# Patient Record
Sex: MALE | Race: WHITE | Employment: OTHER | URBAN - METROPOLITAN AREA
[De-identification: names, ages, dates, MRNs, and addresses within clinical notes are randomized per-mention and may not be internally consistent; named-entity substitution may affect disease eponyms.]

---

## 2017-03-16 ENCOUNTER — RADIANT APPOINTMENT (OUTPATIENT)
Dept: GENERAL RADIOLOGY | Facility: CLINIC | Age: 57
End: 2017-03-16
Attending: PHYSICIAN ASSISTANT
Payer: COMMERCIAL

## 2017-03-16 ENCOUNTER — OFFICE VISIT (OUTPATIENT)
Dept: FAMILY MEDICINE | Facility: CLINIC | Age: 57
End: 2017-03-16
Payer: COMMERCIAL

## 2017-03-16 VITALS
SYSTOLIC BLOOD PRESSURE: 138 MMHG | BODY MASS INDEX: 27.85 KG/M2 | HEART RATE: 103 BPM | OXYGEN SATURATION: 96 % | WEIGHT: 188 LBS | TEMPERATURE: 98.2 F | HEIGHT: 69 IN | DIASTOLIC BLOOD PRESSURE: 80 MMHG

## 2017-03-16 DIAGNOSIS — K52.9 CHRONIC DIARRHEA: ICD-10-CM

## 2017-03-16 DIAGNOSIS — J18.9 PNEUMONIA OF RIGHT LUNG DUE TO INFECTIOUS ORGANISM, UNSPECIFIED PART OF LUNG: ICD-10-CM

## 2017-03-16 DIAGNOSIS — M46.90 INFLAMMATORY SPONDYLOPATHY, UNSPECIFIED SPINAL REGION (H): ICD-10-CM

## 2017-03-16 DIAGNOSIS — R05.9 COUGH: Primary | ICD-10-CM

## 2017-03-16 DIAGNOSIS — R05.9 COUGH: ICD-10-CM

## 2017-03-16 PROCEDURE — 71020 XR CHEST 2 VW: CPT

## 2017-03-16 PROCEDURE — 99214 OFFICE O/P EST MOD 30 MIN: CPT | Performed by: PHYSICIAN ASSISTANT

## 2017-03-16 RX ORDER — AZITHROMYCIN 250 MG/1
TABLET, FILM COATED ORAL
Qty: 6 TABLET | Refills: 0 | Status: SHIPPED | OUTPATIENT
Start: 2017-03-16 | End: 2017-07-06

## 2017-03-16 RX ORDER — BENZONATATE 100 MG/1
100 CAPSULE ORAL 3 TIMES DAILY PRN
Qty: 30 CAPSULE | Refills: 0 | Status: SHIPPED | OUTPATIENT
Start: 2017-03-16 | End: 2017-07-06

## 2017-03-16 RX ORDER — HYDROCODONE BITARTRATE AND ACETAMINOPHEN 5; 325 MG/1; MG/1
1 TABLET ORAL EVERY 6 HOURS PRN
Qty: 20 TABLET | Refills: 0 | Status: SHIPPED | OUTPATIENT
Start: 2017-03-16 | End: 2017-07-06

## 2017-03-16 RX ORDER — CODEINE PHOSPHATE AND GUAIFENESIN 10; 100 MG/5ML; MG/5ML
SOLUTION ORAL
Qty: 120 ML | Refills: 0 | Status: SHIPPED | OUTPATIENT
Start: 2017-03-16 | End: 2017-07-06

## 2017-03-16 NOTE — NURSING NOTE
"Chief Complaint   Patient presents with     Cough       Initial /80 (BP Location: Left arm, Patient Position: Chair, Cuff Size: Adult Large)  Pulse 103  Temp 98.2  F (36.8  C) (Tympanic)  Ht 5' 8.5\" (1.74 m)  Wt 188 lb (85.3 kg)  SpO2 96%  BMI 28.17 kg/m2 Estimated body mass index is 28.17 kg/(m^2) as calculated from the following:    Height as of this encounter: 5' 8.5\" (1.74 m).    Weight as of this encounter: 188 lb (85.3 kg).  Medication Reconciliation: complete Susanne Love MA      "

## 2017-03-16 NOTE — MR AVS SNAPSHOT
After Visit Summary   3/16/2017    Jonathan Barillas    MRN: [id number]           Patient Information     Date Of Birth          1960        Visit Information        Provider Department      3/16/2017 3:00 PM Gera Hackett PA-C [facility] [place]        Today's Diagnoses     Cough    -  1    Pneumonia of right lung due to infectious organism, unspecified part of lung        Chronic diarrhea        Mass of finger of left hand           Follow-ups after your visit        Additional Services     GASTROENTEROLOGY ADULT REF CONSULT ONLY       Preferred Location: [place] [facility] [phone]      Please be aware that coverage of these services is subject to the terms and limitations of your health insurance plan.  Call member services at your health plan with any benefit or coverage questions.  Any procedures must be performed at a [place] facility OR coordinated by your clinic's referral office.    Please bring the following with you to your appointment:    (1) Any X-Rays, CTs or MRIs which have been performed.  Contact the facility where they were done to arrange for  prior to your scheduled appointment.    (2) List of current medications   (3) This referral request   (4) Any documents/labs given to you for this referral                  Who to contact     If you have questions or need follow up information about today's clinic visit or your schedule please contact [facility] [place] directly at [phone].  Normal or non-critical lab and imaging results will be communicated to you by MyChart, letter or phone within 4 business days after the clinic has received the results. If you do not hear from us within 7 days, please contact the clinic through MyChart or phone. If you have a critical or abnormal lab result, we will notify you by phone as soon as possible.  Submit refill requests through [employer] or call your pharmacy and they will forward the refill request to us.  "Please allow 3 business days for your refill to be completed.          Additional Information About Your Visit        Smart Cubehart Information     [employer] gives you secure access to your electronic health record. If you see a primary care provider, you can also send messages to your care team and make appointments. If you have questions, please call your primary care clinic.  If you do not have a primary care provider, please call [phone] and they will assist you.        Care EveryWhere ID     This is your Care EveryWhere ID. This could be used by other organizations to access your [place] medical records  [id number]        Your Vitals Were     Pulse Temperature Height Pulse Oximetry BMI (Body Mass Index)       103 98.2  F (36.8  C) (Tympanic) 5' 8.5\" (1.74 m) 96% 28.17 kg/m2        Blood Pressure from Last 3 Encounters:   03/16/17 138/80   07/18/16 (!) 124/92   06/29/16 121/76    Weight from Last 3 Encounters:   03/16/17 188 lb (85.3 kg)   07/18/16 172 lb (78 kg)   06/29/16 177 lb (80.3 kg)              We Performed the Following     GASTROENTEROLOGY ADULT REF CONSULT ONLY          Today's Medication Changes          These changes are accurate as of: 3/16/17  3:51 PM.  If you have any questions, ask your nurse or doctor.               Start taking these medicines.        Dose/Directions    azithromycin 250 MG tablet   Commonly known as:  ZITHROMAX   Used for:  Pneumonia of right lung due to infectious organism, unspecified part of lung   Started by:  Gera Hackett PA-C        Two tablets first day, then one tablet daily for four days.   Quantity:  6 tablet   Refills:  0       benzonatate 100 MG capsule   Commonly known as:  TESSALON   Used for:  Pneumonia of right lung due to infectious organism, unspecified part of lung   Started by:  Gera Hackett PA-C        Dose:  100 mg   Take 1 capsule (100 mg) by mouth 3 times daily as needed   Quantity:  30 capsule   Refills:  0       " guaiFENesin-codeine 100-10 MG/5ML Soln solution   Commonly known as:  ROBITUSSIN AC   Used for:  Pneumonia of right lung due to infectious organism, unspecified part of lung   Started by:  Gera Hackett PA-C        Take 1 tsp at night for cough   Quantity:  120 mL   Refills:  0         These medicines have changed or have updated prescriptions.        Dose/Directions    HYDROcodone-acetaminophen 5-325 MG per tablet   Commonly known as:  NORCO   This may have changed:  how much to take   Used for:  Mass of finger of left hand   Changed by:  Gera Hackett PA-C        Dose:  1 tablet   Take 1 tablet by mouth every 6 hours as needed for moderate to severe pain   Quantity:  20 tablet   Refills:  0            Where to get your medicines      These medications were sent to [facility]/pharmacy #[id number] - [place], [place] - [address]  [address] [address]     Phone:  [phone]     azithromycin 250 MG tablet    benzonatate 100 MG capsule         Some of these will need a paper prescription and others can be bought over the counter.  Ask your nurse if you have questions.     Bring a paper prescription for each of these medications     guaiFENesin-codeine 100-10 MG/5ML Soln solution    HYDROcodone-acetaminophen 5-325 MG per tablet                Primary Care Provider Office Phone #    [facility] [place] [phone]       No address on file        Thank you!     Thank you for choosing [facility]  for your care. Our goal is always to provide you with excellent care. Hearing back from our patients is one way we can continue to improve our services. Please take a few minutes to complete the written survey that you may receive in the mail after your visit with us. Thank you!             Your Updated Medication List - Protect others around you: Learn how to safely use, store and throw away your medicines at www.disposemymeds.org.          This list is accurate  as of: 3/16/17  3:51 PM.  Always use your most recent med list.                   Brand Name Dispense Instructions for use    azithromycin 250 MG tablet    ZITHROMAX    6 tablet    Two tablets first day, then one tablet daily for four days.       benzonatate 100 MG capsule    TESSALON    30 capsule    Take 1 capsule (100 mg) by mouth 3 times daily as needed       guaiFENesin-codeine 100-10 MG/5ML Soln solution    ROBITUSSIN AC    120 mL    Take 1 tsp at night for cough       HYDROcodone-acetaminophen 5-325 MG per tablet    NORCO    20 tablet    Take 1 tablet by mouth every 6 hours as needed for moderate to severe pain       sildenafil 100 MG cap/tab    VIAGRA    100 tablet    Take 1/2 to 1 tab 1 hr before intercourse.       VITAMIN C PO      Take 500 mg by mouth daily       VITAMIN D3 PO      Take 2,000 Units by mouth

## 2017-03-16 NOTE — PROGRESS NOTES
SUBJECTIVE:                                                    Jonathan Barillas is a 56 year old male who presents to clinic today for the following health issues:      Acute Illness   Acute illness concerns: coughing  Onset: 2 weeks    Fever: no    Chills/Sweats: YES    Headache (location?): no     Sinus Pressure:no    Conjunctivitis:  YES: both    Ear Pain: no - tinnitus.     Rhinorrhea: YES    Congestion: YES    Sore Throat: YES- tickly, irritated from the drainage     Cough: YES-productive of brown sputum    Wheeze: YES    Decreased Appetite: YES    Nausea: no    Vomiting: no    Diarrhea:  YES    Dysuria/Freq.: no    Fatigue/Achiness: YES    Sick/Strep Exposure: no     Therapies Tried and outcome: ibuprofen.       2 week hx of cough, SOB fatigue and mild wheezing, cough is becoming more productive, he notes chills and sweats, but has not measured a fever.  No hx of asthma.      Abdominal pain/diarrhea: ongoing x 5-6 years, daily chronic diarrhea that is loose-watery.  Seems to occur directly after eating and is associated with abdominal cramping/pain and bloating.  Cannot identify a food trigger, no other symptoms.  He has a hx of ankylosing spondylitis and would like to see a GI for further evaluation.  He is requesting a referral today      Back pain: Intermittently takes norco for back pain ( last prescribed 8 months ago, #40 tablets).  On average takes 1 tab every 2-3 weeks when back pain acutely worsens.  He has not seen a rheumatologist for his ankylosing spondylitis.  Would like refill of norco today. No new or worsening symptoms recently            Problem list and histories reviewed & adjusted, as indicated.  Additional history: as documented    Patient Active Problem List   Diagnosis     Inflammatory spondylopathy (H)     Vitamin D deficiency     CARDIOVASCULAR SCREENING; LDL GOAL LESS THAN 130     Chronic diarrhea     History of tobacco abuse     Hypertension goal BP (blood pressure) < 140/80      Acute gallstone pancreatitis     Generalized anxiety disorder     Iritis     Ankylosing spondylitis (H)     Ganglion cyst of finger of left hand     Past Surgical History   Procedure Laterality Date     Endoscopic ultrasound lower gastrointestional tract (gi) N/A 12/9/2015     Procedure: ENDOSCOPIC ULTRASOUND LOWER GASTROINTESTIONAL TRACT (GI);  Surgeon: Flaquito Crawford MD;  Location: [facility] OR     Laparoscopic cholecystectomy N/A 12/11/2015     Procedure: LAPAROSCOPIC CHOLECYSTECTOMY;  Surgeon: Romain Cervantes MD;  Location: [facility] OR     Excise mass finger Left 7/18/2016     Procedure: EXCISE MASS FINGER;  Surgeon: Maikol Meier MD;  Location: [facility] OR       Social History   Substance Use Topics     Smoking status: Former Smoker     Packs/day: 1.00     Years: 20.00     Types: Cigarettes     Quit date: 1/1/2016     Smokeless tobacco: Never Used     Alcohol use 0.0 oz/week     0 Standard drinks or equivalent per week      Comment: 4 drinks per week     Family History   Problem Relation Age of Onset     Autoimmune Disease Other      Neurologic Disorder Cousin      ALS     Family History Negative No family hx of          Current Outpatient Prescriptions   Medication Sig Dispense Refill     azithromycin (ZITHROMAX) 250 MG tablet Two tablets first day, then one tablet daily for four days. 6 tablet 0     benzonatate (TESSALON) 100 MG capsule Take 1 capsule (100 mg) by mouth 3 times daily as needed 30 capsule 0     guaiFENesin-codeine (ROBITUSSIN AC) 100-10 MG/5ML SOLN solution Take 1 tsp at night for cough 120 mL 0     HYDROcodone-acetaminophen (NORCO) 5-325 MG per tablet Take 1 tablet by mouth every 6 hours as needed for moderate to severe pain 20 tablet 0     Cholecalciferol (VITAMIN D3 PO) Take 2,000 Units by mouth       Ascorbic Acid (VITAMIN C PO) Take 500 mg by mouth daily       sildenafil (VIAGRA) 100 MG tablet Take 1/2 to 1 tab 1 hr before intercourse. 100 tablet 0     Allergies   Allergen Reactions  "    Penicillin [Penicillins] Swelling       Reviewed and updated as needed this visit by clinical staff       Reviewed and updated as needed this visit by Provider         ROS:  Constitutional, HEENT, cardiovascular, pulmonary, gi and gu systems are negative, except as otherwise noted.    OBJECTIVE:                                                    /80 (BP Location: Left arm, Patient Position: Chair, Cuff Size: Adult Large)  Pulse 103  Temp 98.2  F (36.8  C) (Tympanic)  Ht 5' 8.5\" (1.74 m)  Wt 188 lb (85.3 kg)  SpO2 96%  BMI 28.17 kg/m2  Body mass index is 28.17 kg/(m^2).  GENERAL: healthy, alert and no distress  EYES: Eyes grossly normal to inspection, PERRL and conjunctivae and sclerae normal  HENT: ear canals and TM's normal, nose and mouth without ulcers or lesions  NECK: no adenopathy  RESP:crackles noted at right lung base, scattered wheezing throughout  CV: regular rate and rhythm, normal S1 S2, no S3 or S4, no murmur  Diagnostic Test Results:  none      ASSESSMENT/PLAN:                                                        1. Pneumonia of right lung due to infectious organism, unspecified part of lung  Chest x ray done today and read with patient present in clinic.  Right heart border is blunted by small patchy appearing infiltrate.  Will treat with z pack for PNA.  - azithromycin (ZITHROMAX) 250 MG tablet; Two tablets first day, then one tablet daily for four days.  Dispense: 6 tablet; Refill: 0  - benzonatate (TESSALON) 100 MG capsule; Take 1 capsule (100 mg) by mouth 3 times daily as needed  Dispense: 30 capsule; Refill: 0  - guaiFENesin-codeine (ROBITUSSIN AC) 100-10 MG/5ML SOLN solution; Take 1 tsp at night for cough  Dispense: 120 mL; Refill: 0    2. Chronic diarrhea  - GASTROENTEROLOGY ADULT REF CONSULT ONLY    3. Cough  - XR Chest 2 Views; Future    4. Inflammatory spondylopathy, unspecified spinal region (H)  Small script of Norco given , advised to follow up with PCP for further " fills.      See Patient Instructions    Gera Hackett PA-C  [facility]

## 2017-05-02 ENCOUNTER — TELEPHONE (OUTPATIENT)
Dept: FAMILY MEDICINE | Facility: CLINIC | Age: 57
End: 2017-05-02

## 2017-05-02 NOTE — TELEPHONE ENCOUNTER
Patient call stating that he was cutting tree branches with a power saw.  A piece of wood flying to his left thumb on Sunday  Thumb is very swollen, black bruise underneath nail bed and cuticle, very painful, warm to touch.     Denies foreign body in nail, discharge, pus, fever.     Has been using ice with no relief  appt scheduled for tomorrow morning for further assessment    Next 5 appointments (look out 90 days)     May 03, 2017  9:00 AM CDT   Office Visit with Ashley Rausch MD   [facility]    [address] [place] [id number] [phone]                Tami Dunn RN

## 2017-07-06 ENCOUNTER — RADIANT APPOINTMENT (OUTPATIENT)
Dept: GENERAL RADIOLOGY | Facility: CLINIC | Age: 57
End: 2017-07-06
Attending: FAMILY MEDICINE
Payer: COMMERCIAL

## 2017-07-06 ENCOUNTER — OFFICE VISIT (OUTPATIENT)
Dept: FAMILY MEDICINE | Facility: CLINIC | Age: 57
End: 2017-07-06
Payer: COMMERCIAL

## 2017-07-06 VITALS
SYSTOLIC BLOOD PRESSURE: 144 MMHG | BODY MASS INDEX: 27.42 KG/M2 | HEART RATE: 84 BPM | WEIGHT: 183 LBS | OXYGEN SATURATION: 97 % | DIASTOLIC BLOOD PRESSURE: 86 MMHG | TEMPERATURE: 98.2 F

## 2017-07-06 DIAGNOSIS — J20.9 ACUTE BRONCHITIS WITH SYMPTOMS > 10 DAYS: ICD-10-CM

## 2017-07-06 DIAGNOSIS — R05.9 COUGH: Primary | ICD-10-CM

## 2017-07-06 DIAGNOSIS — R05.9 COUGH: ICD-10-CM

## 2017-07-06 PROCEDURE — 99214 OFFICE O/P EST MOD 30 MIN: CPT | Performed by: FAMILY MEDICINE

## 2017-07-06 PROCEDURE — 71020 XR CHEST 2 VW: CPT

## 2017-07-06 RX ORDER — ALBUTEROL SULFATE 90 UG/1
2 AEROSOL, METERED RESPIRATORY (INHALATION) EVERY 6 HOURS PRN
Qty: 1 INHALER | Refills: 0 | Status: SHIPPED | OUTPATIENT
Start: 2017-07-06 | End: 2017-09-19

## 2017-07-06 RX ORDER — AZITHROMYCIN 250 MG/1
TABLET, FILM COATED ORAL
Qty: 6 TABLET | Refills: 0 | Status: SHIPPED | OUTPATIENT
Start: 2017-07-06 | End: 2017-09-19

## 2017-07-06 NOTE — NURSING NOTE
"Chief Complaint   Patient presents with     Cough       Initial /86 (BP Location: Left arm)  Pulse 84  Temp 98.2  F (36.8  C) (Tympanic)  Wt 183 lb (83 kg)  SpO2 97%  BMI 27.42 kg/m2 Estimated body mass index is 27.42 kg/(m^2) as calculated from the following:    Height as of 3/16/17: 5' 8.5\" (1.74 m).    Weight as of this encounter: 183 lb (83 kg).  Medication Reconciliation: complete    Current Outpatient Prescriptions   Medication Sig Dispense Refill     Cholecalciferol (VITAMIN D3 PO) Take 2,000 Units by mouth       Ascorbic Acid (VITAMIN C PO) Take 500 mg by mouth daily       sildenafil (VIAGRA) 100 MG tablet Take 1/2 to 1 tab 1 hr before intercourse. 100 tablet 0     azithromycin (ZITHROMAX) 250 MG tablet Two tablets first day, then one tablet daily for four days. (Patient not taking: Reported on 7/6/2017) 6 tablet 0     benzonatate (TESSALON) 100 MG capsule Take 1 capsule (100 mg) by mouth 3 times daily as needed (Patient not taking: Reported on 7/6/2017) 30 capsule 0     guaiFENesin-codeine (ROBITUSSIN AC) 100-10 MG/5ML SOLN solution Take 1 tsp at night for cough (Patient not taking: Reported on 7/6/2017) 120 mL 0     HYDROcodone-acetaminophen (NORCO) 5-325 MG per tablet Take 1 tablet by mouth every 6 hours as needed for moderate to severe pain (Patient not taking: Reported on 7/6/2017) 20 tablet 0       Kolby NGUYEN CMA  "

## 2017-07-06 NOTE — MR AVS SNAPSHOT
After Visit Summary   7/6/2017    Jonathan Barillas    MRN: [id number]           Patient Information     Date Of Birth          1960        Visit Information        Provider Department      7/6/2017 1:20 PM Casey Gilbert MD [facility] [place]        Today's Diagnoses     Cough    -  1    Acute bronchitis with symptoms > 10 days           Follow-ups after your visit        Who to contact     If you have questions or need follow up information about today's clinic visit or your schedule please contact [facility] [place] directly at [phone].  Normal or non-critical lab and imaging results will be communicated to you by Das[employer], letter or phone within 4 business days after the clinic has received the results. If you do not hear from us within 7 days, please contact the clinic through Dasient[employer] or phone. If you have a critical or abnormal lab result, we will notify you by phone as soon as possible.  Submit refill requests through [employer] or call your pharmacy and they will forward the refill request to us. Please allow 3 business days for your refill to be completed.          Additional Information About Your Visit        MyChart Information     [employer] gives you secure access to your electronic health record. If you see a primary care provider, you can also send messages to your care team and make appointments. If you have questions, please call your primary care clinic.  If you do not have a primary care provider, please call [phone] and they will assist you.        Care EveryWhere ID     This is your Care EveryWhere ID. This could be used by other organizations to access your [place] medical records  [id number]        Your Vitals Were     Pulse Temperature Pulse Oximetry BMI (Body Mass Index)          84 98.2  F (36.8  C) (Tympanic) 97% 27.42 kg/m2         Blood Pressure from Last 3 Encounters:   07/06/17 144/86   03/16/17 138/80   07/18/16 (!) 124/92    Weight from  Last 3 Encounters:   07/06/17 183 lb (83 kg)   03/16/17 188 lb (85.3 kg)   07/18/16 172 lb (78 kg)                 Today's Medication Changes          These changes are accurate as of: 7/6/17  2:13 PM.  If you have any questions, ask your nurse or doctor.               Start taking these medicines.        Dose/Directions    albuterol 108 (90 BASE) MCG/ACT Inhaler   Commonly known as:  PROAIR HFA/PROVENTIL HFA/VENTOLIN HFA   Used for:  Acute bronchitis with symptoms > 10 days, Cough   Started by:  Casey Gilbert MD        Dose:  2 puff   Inhale 2 puffs into the lungs every 6 hours as needed for shortness of breath / dyspnea or wheezing   Quantity:  1 Inhaler   Refills:  0         Stop taking these medicines if you haven't already. Please contact your care team if you have questions.     benzonatate 100 MG capsule   Commonly known as:  TESSALON   Stopped by:  Casey Gilbert MD           guaiFENesin-codeine 100-10 MG/5ML Soln solution   Commonly known as:  ROBITUSSIN AC   Stopped by:  Casey Gilbert MD           HYDROcodone-acetaminophen 5-325 MG per tablet   Commonly known as:  NORCO   Stopped by:  Casey Gilbert MD                Where to get your medicines      These medications were sent to [facility] [address] [address]     Phone:  [phone]     albuterol 108 (90 BASE) MCG/ACT Inhaler    azithromycin 250 MG tablet                Primary Care Provider Office Phone #    [facility] [phone]       No address on file        Equal Access to Services     JONA SILVERMAN AH: Hadii jaci ku hadasho Soomaali, waaxda luqadaha, qaybta kaalmada aderikiyada, bernice sarabia. So [facility] [phone].    ATENCIÓN: Si habla español, tiene a taylor disposición servicios gratuitos de asistencia lingüística. Llame al [phone].    We comply with applicable federal civil rights laws and [place] laws. We do not  discriminate on the basis of race, color, national origin, age, disability sex, sexual orientation or gender identity.            Thank you!     Thank you for choosing [facility] [place]  for your care. Our goal is always to provide you with excellent care. Hearing back from our patients is one way we can continue to improve our services. Please take a few minutes to complete the written survey that you may receive in the mail after your visit with us. Thank you!             Your Updated Medication List - Protect others around you: Learn how to safely use, store and throw away your medicines at www.disposemymeds.org.          This list is accurate as of: 7/6/17  2:13 PM.  Always use your most recent med list.                   Brand Name Dispense Instructions for use Diagnosis    albuterol 108 (90 BASE) MCG/ACT Inhaler    PROAIR HFA/PROVENTIL HFA/VENTOLIN HFA    1 Inhaler    Inhale 2 puffs into the lungs every 6 hours as needed for shortness of breath / dyspnea or wheezing    Acute bronchitis with symptoms > 10 days, Cough       azithromycin 250 MG tablet    ZITHROMAX    6 tablet    Two tablets first day, then one tablet daily for four days.    Acute bronchitis with symptoms > 10 days       sildenafil 100 MG cap/tab    VIAGRA    100 tablet    Take 1/2 to 1 tab 1 hr before intercourse.    Impotence of organic origin       VITAMIN C PO      Take 500 mg by mouth daily        VITAMIN D3 PO      Take 2,000 Units by mouth

## 2017-07-06 NOTE — PROGRESS NOTES
SUBJECTIVE:                                                    Jonathan Barillas is a 56 year old male who presents to clinic today for the following health issues:      Acute Illness   Acute illness concerns: cough  Smoking habit.  Onset: 1 week    Fever: no    Chills/Sweats: no    Headache (location?): no    Sinus Pressure:no    Conjunctivitis:  no    Ear Pain: no    Rhinorrhea: no    Congestion: no    Sore Throat: no     Cough: YES    Wheeze: no    Decreased Appetite: no    Nausea: no    Vomiting: no    Diarrhea:  no    Dysuria/Freq.: no    Fatigue/Achiness: no    Sick/Strep Exposure: no     Therapies Tried and outcome: otc antihistamine       Problem list and histories reviewed & adjusted, as indicated.  Additional history: as documented        Reviewed and updated as needed this visit by clinical staff  Tobacco  Allergies  Meds       Reviewed and updated as needed this visit by Provider         ROS:  C: NEGATIVE for fever, chills, change in weight  E/M: NEGATIVE for ear, mouth and throat problems  RESP:POSITIVE for cough-productive  CV: NEGATIVE for chest pain, palpitations or peripheral edema    OBJECTIVE:                                                    /86 (BP Location: Left arm)  Pulse 84  Temp 98.2  F (36.8  C) (Tympanic)  Wt 183 lb (83 kg)  SpO2 97%  BMI 27.42 kg/m2  Body mass index is 27.42 kg/(m^2).   GENERAL: healthy, alert, well nourished, well hydrated, no distress  HENT: ear canals- normal; TMs- normal; Nose- normal; Mouth- no ulcers, no lesions  NECK: no tenderness, no adenopathy, no asymmetry, no masses, no stiffness; thyroid- normal to palpation  RESP: lungs have mild to moderate wheezing.  CV: regular rates and rhythm, normal S1 S2, no S3 or S4 and no murmur, no click or rub -         ASSESSMENT/PLAN:                                                        ICD-10-CM    1. Cough R05 XR Chest 2 Views     albuterol (PROAIR HFA/PROVENTIL HFA/VENTOLIN HFA) 108 (90 BASE) MCG/ACT  Inhaler   2. Acute bronchitis with symptoms > 10 days J20.9 azithromycin (ZITHROMAX) 250 MG tablet     albuterol (PROAIR HFA/PROVENTIL HFA/VENTOLIN HFA) 108 (90 BASE) MCG/ACT Inhaler   CXR reviewed, no acute changes.  Symptoms most likely due to the bronchitis, suggested azithromycin along with the albuterol.  Dicussed quitting smoking.    Casey Gilbert MD  [facility]

## 2017-09-12 ENCOUNTER — TELEPHONE (OUTPATIENT)
Dept: FAMILY MEDICINE | Facility: CLINIC | Age: 57
End: 2017-09-12

## 2017-09-12 NOTE — TELEPHONE ENCOUNTER
Pt called and is having back pain and would like an rx for hydrocodone. Please send rx to  [facility]. Any questions pt can be reached [phone].   Reba Astorga,

## 2017-09-19 ENCOUNTER — OFFICE VISIT (OUTPATIENT)
Dept: FAMILY MEDICINE | Facility: CLINIC | Age: 57
End: 2017-09-19
Payer: COMMERCIAL

## 2017-09-19 VITALS
WEIGHT: 176 LBS | HEART RATE: 99 BPM | DIASTOLIC BLOOD PRESSURE: 92 MMHG | OXYGEN SATURATION: 98 % | SYSTOLIC BLOOD PRESSURE: 148 MMHG | BODY MASS INDEX: 26.37 KG/M2 | TEMPERATURE: 99.1 F

## 2017-09-19 DIAGNOSIS — R97.20 ELEVATED PROSTATE SPECIFIC ANTIGEN (PSA): ICD-10-CM

## 2017-09-19 DIAGNOSIS — Z79.899 CONTROLLED SUBSTANCE AGREEMENT SIGNED: ICD-10-CM

## 2017-09-19 DIAGNOSIS — I10 HYPERTENSION GOAL BP (BLOOD PRESSURE) < 140/80: ICD-10-CM

## 2017-09-19 DIAGNOSIS — G89.29 OTHER CHRONIC PAIN: ICD-10-CM

## 2017-09-19 DIAGNOSIS — M45.0 ANKYLOSING SPONDYLITIS OF MULTIPLE SITES IN SPINE (H): ICD-10-CM

## 2017-09-19 DIAGNOSIS — M46.90 INFLAMMATORY SPONDYLOPATHY, UNSPECIFIED SPINAL REGION (H): Primary | ICD-10-CM

## 2017-09-19 LAB
ERYTHROCYTE [DISTWIDTH] IN BLOOD BY AUTOMATED COUNT: 13 % (ref 10–15)
HCT VFR BLD AUTO: 50.3 % (ref 40–53)
HGB BLD-MCNC: 17 G/DL (ref 13.3–17.7)
MCH RBC QN AUTO: 33.3 PG (ref 26.5–33)
MCHC RBC AUTO-ENTMCNC: 33.8 G/DL (ref 31.5–36.5)
MCV RBC AUTO: 98 FL (ref 78–100)
PLATELET # BLD AUTO: 248 10E9/L (ref 150–450)
RBC # BLD AUTO: 5.11 10E12/L (ref 4.4–5.9)
WBC # BLD AUTO: 11.4 10E9/L (ref 4–11)

## 2017-09-19 PROCEDURE — 85027 COMPLETE CBC AUTOMATED: CPT | Performed by: INTERNAL MEDICINE

## 2017-09-19 PROCEDURE — 36415 COLL VENOUS BLD VENIPUNCTURE: CPT | Performed by: INTERNAL MEDICINE

## 2017-09-19 PROCEDURE — G0103 PSA SCREENING: HCPCS | Performed by: INTERNAL MEDICINE

## 2017-09-19 PROCEDURE — 80053 COMPREHEN METABOLIC PANEL: CPT | Performed by: INTERNAL MEDICINE

## 2017-09-19 PROCEDURE — 99214 OFFICE O/P EST MOD 30 MIN: CPT | Performed by: INTERNAL MEDICINE

## 2017-09-19 RX ORDER — HYDROCODONE BITARTRATE AND ACETAMINOPHEN 5; 325 MG/1; MG/1
1-2 TABLET ORAL EVERY 6 HOURS PRN
Qty: 40 TABLET | Refills: 0 | Status: SHIPPED | OUTPATIENT
Start: 2017-09-19 | End: 2018-01-09

## 2017-09-19 NOTE — MR AVS SNAPSHOT
After Visit Summary   9/19/2017    Jonathan Barillas    MRN: [id number]           Patient Information     Date Of Birth          1960        Visit Information        Provider Department      9/19/2017 2:00 PM Thuy Bustos MD [facility] [place]        Today's Diagnoses     Inflammatory spondylopathy, unspecified spinal region (H)    -  1    Ankylosing spondylitis of multiple sites in spine (H)        Elevated prostate specific antigen (PSA)        Hypertension goal BP (blood pressure) < 140/80        Controlled substance agreement signed        Other chronic pain           Follow-ups after your visit        Who to contact     If you have questions or need follow up information about today's clinic visit or your schedule please contact [facility] [place] directly at [phone].  Normal or non-critical lab and imaging results will be communicated to you by MyChart, letter or phone within 4 business days after the clinic has received the results. If you do not hear from us within 7 days, please contact the clinic through bVisualhart or phone. If you have a critical or abnormal lab result, we will notify you by phone as soon as possible.  Submit refill requests through Volumental or call your pharmacy and they will forward the refill request to us. Please allow 3 business days for your refill to be completed.          Additional Information About Your Visit        MyChart Information     Volumental gives you secure access to your electronic health record. If you see a primary care provider, you can also send messages to your care team and make appointments. If you have questions, please call your primary care clinic.  If you do not have a primary care provider, please call [phone] and they will assist you.        Care EveryWhere ID     This is your Care EveryWhere ID. This could be used by other organizations to access your [facility] medical records  [id number]        Your  Vitals Were     Pulse Temperature Pulse Oximetry BMI (Body Mass Index)          99 99.1  F (37.3  C) (Oral) 98% 26.37 kg/m2         Blood Pressure from Last 3 Encounters:   09/19/17 (!) 148/92   07/06/17 144/86   03/16/17 138/80    Weight from Last 3 Encounters:   09/19/17 176 lb (79.8 kg)   07/06/17 183 lb (83 kg)   03/16/17 188 lb (85.3 kg)              We Performed the Following     CBC with platelets     Comprehensive metabolic panel     Prostate spec antigen screen          Today's Medication Changes          These changes are accurate as of: 9/19/17 11:59 PM.  If you have any questions, ask your nurse or doctor.               Start taking these medicines.        Dose/Directions    HYDROcodone-acetaminophen 5-325 MG per tablet   Commonly known as:  NORCO   Used for:  Inflammatory spondylopathy, unspecified spinal region (H)   Started by:  Thuy Bustos MD        Dose:  1-2 tablet   Take 1-2 tablets by mouth every 6 hours as needed for moderate to severe pain   Quantity:  40 tablet   Refills:  0            Where to get your medicines      Some of these will need a paper prescription and others can be bought over the counter.  Ask your nurse if you have questions.     Bring a paper prescription for each of these medications     HYDROcodone-acetaminophen 5-325 MG per tablet                Primary Care Provider Office Phone # Fax #    Thuy Bustos [phone]        [facility] [address] [facility] [place] [address]        Equal Access to Services     [facility] AH: Hadii jaci webstero Soemmyali, waaxda luqadaha, qaybta kaalmada adeegyada, waxay luis barger aderiki sarabia. So [facility] [phone].    ATENCIÓN: Si habla español, tiene a taylor disposición servicios gratuitos de asistencia lingüística. Llame al [phone].    We comply with applicable federal civil rights laws and [place] laws. We do not discriminate on the basis of race, color, national origin, age, disability sex, sexual  orientation or gender identity.            Thank you!     Thank you for choosing [facility] [place]  for your care. Our goal is always to provide you with excellent care. Hearing back from our patients is one way we can continue to improve our services. Please take a few minutes to complete the written survey that you may receive in the mail after your visit with us. Thank you!             Your Updated Medication List - Protect others around you: Learn how to safely use, store and throw away your medicines at www.disposemymeds.org.          This list is accurate as of: 9/19/17 11:59 PM.  Always use your most recent med list.                   Brand Name Dispense Instructions for use Diagnosis    HYDROcodone-acetaminophen 5-325 MG per tablet    NORCO    40 tablet    Take 1-2 tablets by mouth every 6 hours as needed for moderate to severe pain    Inflammatory spondylopathy, unspecified spinal region (H)       sildenafil 100 MG tablet    VIAGRA    100 tablet    Take 1/2 to 1 tab 1 hr before intercourse.    Impotence of organic origin       VITAMIN C PO      Take 500 mg by mouth daily        VITAMIN D3 PO      Take 2,000 Units by mouth

## 2017-09-19 NOTE — PROGRESS NOTES
SUBJECTIVE:   Jonathan Barillas is a 56 year old male who presents to clinic today for the following health issues:      Concern - medication request   Onset: 40 years     Description:    pt requesting pain medication     Intensity: moderate to severe     Progression of Symptoms:  worsening    Accompanying Signs & Symptoms:  Joint pain     Previous history of similar problem:   Yes     Precipitating factors:   Worsened by:     Alleviating factors:  Improved by: Advil     Therapies Tried and outcome:  Janneth Castillo is a 55 y/o male with Ankylosing Spondylitis. He is getting more frequent flare ups. He has seen two rheumatologists, most recently was 6 months ago. He was not given any solution. He was told to exercise, stretch, take NSAIDS, and recommended starting Humira or Enbrel. He generally will take Ibuprofen 400 mg in the morning and that is all he needs.       Problem list and histories reviewed & adjusted, as indicated.  Additional history: as documented    Patient Active Problem List   Diagnosis     Inflammatory spondylopathy (H)     Vitamin D deficiency     CARDIOVASCULAR SCREENING; LDL GOAL LESS THAN 130     Chronic diarrhea     History of tobacco abuse     Hypertension goal BP (blood pressure) < 140/80     Acute gallstone pancreatitis     Generalized anxiety disorder     Iritis     Ankylosing spondylitis (H)     Ganglion cyst of finger of left hand     Past Surgical History:   Procedure Laterality Date     ENDOSCOPIC ULTRASOUND LOWER GASTROINTESTIONAL TRACT (GI) N/A 12/9/2015    Procedure: ENDOSCOPIC ULTRASOUND LOWER GASTROINTESTIONAL TRACT (GI);  Surgeon: Flaquito Crawford MD;  Location:  OR     EXCISE MASS FINGER Left 7/18/2016    Procedure: EXCISE MASS FINGER;  Surgeon: Maikol Meier MD;  Location:  OR     LAPAROSCOPIC CHOLECYSTECTOMY N/A 12/11/2015    Procedure: LAPAROSCOPIC CHOLECYSTECTOMY;  Surgeon: Romain Cervantes MD;  Location:  OR       Social History   Substance Use  Topics     Smoking status: Former Smoker     Packs/day: 1.00     Years: 20.00     Types: Cigarettes     Quit date: 1/1/2016     Smokeless tobacco: Never Used     Alcohol use 0.0 oz/week     0 Standard drinks or equivalent per week      Comment: 4 drinks per week     Family History   Problem Relation Age of Onset     Autoimmune Disease Other      Neurologic Disorder Cousin      ALS     Family History Negative No family hx of              [facility] and [facility]       Reviewed and updated as needed this visit by Provider         ROS:  Constitutional, HEENT, cardiovascular, pulmonary, gi and gu systems are negative, except as otherwise noted.      OBJECTIVE:   BP (!) 148/92 (BP Location: Left arm, Cuff Size: Adult Regular)  Pulse 99  Temp 99.1  F (37.3  C) (Oral)  Wt 176 lb (79.8 kg)  SpO2 98%  BMI 26.37 kg/m2  Body mass index is 26.37 kg/(m^2).  GENERAL: healthy, alert and no distress  RESP: lungs clear to auscultation - no rales, rhonchi or wheezes  CV: regular rate and rhythm, normal S1 S2, no S3 or S4, no murmur, click or rub, no peripheral edema and peripheral pulses strong  PSYCH: mentation appears normal, affect normal/bright    Diagnostic Test Results:  none     ASSESSMENT/PLAN:     1. Inflammatory spondylopathy, unspecified spinal region (H)  Does not want to go on a biologic medication due to risk for side effects and infection. I can understand his hesitation. He is generally able to get by with taking 400 mg of Advil in the morning but reports a flare every several weeks. His last script for Norco lasted him about 3 months. Will plan to refill today and can refill every 3 months as needed. Controlled substance agreement signed today.   - HYDROcodone-acetaminophen (NORCO) 5-325 MG per tablet; Take 1-2 tablets by mouth every 6 hours as needed for moderate to severe pain  Dispense: 40 tablet; Refill: 0  - Comprehensive metabolic panel  - CBC with  platelets    2. Ankylosing spondylitis of multiple sites in spine (H)    3. Elevated prostate specific antigen (PSA)  - Prostate spec antigen screen    4. Hypertension goal BP (blood pressure) < 140/80  BP high today. Will recheck at next visit.       Follow up in 6 month(s) or sooner if needed      Thuy Bustos MD  [facility]

## 2017-09-19 NOTE — LETTER
[facility]    09/19/17    Patient: Jonathan Barillas  YOB: 1960  Medical Record Number: [id number]                                                                  Controlled Substance Agreement  I understand that my care provider has prescribed controlled substances (narcotics, tranquilizers, and/or stimulants) to help manage my condition(s).  I am taking this medicine to help me function or work.  I know that this is strong medicine.  It could have serious side effects and even cause a dependency on the drug.  If I stop these medicines suddenly, I could have severe withdrawal symptoms.    The risks, benefits, and side effects of these medication(s) were explained to me.  I agree that:  1. I will take part in other treatments as advised by my provider.  This may be psychiatry or counseling, physical therapy, behavioral therapy, group treatment, or a referral to a pain clinic.  I will reduce or stop my medicine when my provider tells me to do so.   2. I will take my medicines as prescribed.  I will not change the dose or schedule unless my provider tells me to.  There will be no refills if I  run out early.   I may be contacted at any time without warning and asked to complete a drug test or pill count.   3. I will keep all my appointments at the clinic.  If I miss appointments or fail to follow instructions, my provider may stop my medicine.  4. I will not ask other providers to prescribe controlled substances. And I will not accept controlled substances from other people. If I need another prescribed controlled substance for a new reason, I will notify my provider within one business day.  5. If I enroll in the [place] Medical Marijuana program, I will tell my provider.  I will also sign an agreement to share my medical records with my provider.  6. I will use one pharmacy to fill all of my controlled substance prescriptions.  If my prescription is mailed to my pharmacy, it may  take 5 to 7 days for my medicine to be ready.  7. I understand that my provider, clinic care team, and pharmacy can track controlled substance prescriptions from other providers through a central database (prescription monitoring program).  8. I will bring in my list of medications (or my medicine bottles) each time I come to the clinic.  REV- 04/2016                                                                                                                                            Page 1 of 2      [facility] [place]    09/19/17    Patient: Jonathan Barillas  YOB: 1960  Medical Record Number: [id number]    9. Refills of controlled substances will be made only during office hours.  It is up to me to make sure that I do not run out of my medicines on weekends or holidays.    10. I am responsible for my prescriptions.  If the medicine is lost or stolen, it will not be replaced.   I also agree not to share these medicines with anyone.  11. I agree to not use ANY illegal or recreational drugs.  This includes marijuana, cocaine, bath salts or other drugs.  I agree not to use alcohol unless my provider says I may.  I agree to give urine samples whenever asked.  If I fail to give a urine sample, the provider may stop my medicine.     12. I will tell my nurse or provider right away if I become pregnant or have a new medical problem treated outside of [facility].  13. I understand that this medicine can affect my thinking and judgment.  It may be unsafe for me to drive, use machinery and do dangerous tasks.  I will not do any of these things until I know how the medicine affects me.  If my dose changes, I will wait to see how it affects me.  I will contact my provider if I have concerns about medicine side effects.  I understand that if I do not follow any of the conditions above, my prescriptions or treatment may be stopped.    I agree that my provider, clinic care team, and pharmacy may  work with any city, state or federal law enforcement agency that investigates the misuse, sale, or other diversion of my controlled medicine. I will allow my provider to discuss my care with or share a copy of this agreement with any other treating provider, pharmacy or emergency room where I receive care.  I agree to give up (waive) any right of privacy or confidentiality with respect to these authorizations.   I have read this agreement and have asked questions about anything I did not understand.   ___________________________________    ___________________________  Patient Signature                                                           Date and Time  ___________________________________     ____________________________  Witness                                                                            Date and Time  ___________________________________  Thuy Bustos MD  REV-  04/2016                                                                                                                                                                 Page 2 of 2

## 2017-09-20 PROBLEM — Z79.899 CONTROLLED SUBSTANCE AGREEMENT SIGNED: Status: ACTIVE | Noted: 2017-09-20

## 2017-09-20 LAB
ALBUMIN SERPL-MCNC: 4.2 G/DL (ref 3.4–5)
ALP SERPL-CCNC: 76 U/L (ref 40–150)
ALT SERPL W P-5'-P-CCNC: 48 U/L (ref 0–70)
ANION GAP SERPL CALCULATED.3IONS-SCNC: 9 MMOL/L (ref 3–14)
AST SERPL W P-5'-P-CCNC: 37 U/L (ref 0–45)
BILIRUB SERPL-MCNC: 0.7 MG/DL (ref 0.2–1.3)
BUN SERPL-MCNC: 12 MG/DL (ref 7–30)
CALCIUM SERPL-MCNC: 9.1 MG/DL (ref 8.5–10.1)
CHLORIDE SERPL-SCNC: 105 MMOL/L (ref 94–109)
CO2 SERPL-SCNC: 27 MMOL/L (ref 20–32)
CREAT SERPL-MCNC: 0.71 MG/DL (ref 0.66–1.25)
GFR SERPL CREATININE-BSD FRML MDRD: >90 ML/MIN/1.7M2
GLUCOSE SERPL-MCNC: 103 MG/DL (ref 70–99)
POTASSIUM SERPL-SCNC: 3.8 MMOL/L (ref 3.4–5.3)
PROT SERPL-MCNC: 8 G/DL (ref 6.8–8.8)
PSA SERPL-ACNC: 1.93 UG/L (ref 0–4)
SODIUM SERPL-SCNC: 141 MMOL/L (ref 133–144)

## 2018-01-09 DIAGNOSIS — M46.90 INFLAMMATORY SPONDYLOPATHY, UNSPECIFIED SPINAL REGION (H): ICD-10-CM

## 2018-01-09 NOTE — TELEPHONE ENCOUNTER
Controlled Substance Refill Request for Norco  Problem List Complete:  No     PROVIDER TO CONSIDER COMPLETION OF PROBLEM LIST AND OVERVIEW/CONTROLLED SUBSTANCE AGREEMENT    Last Written Prescription Date:  9/19/17  Last Fill Quantity: 40,   # refills: 0    Last Office Visit with [facility] primary care provider: 9/19/17 with note as below    Inflammatory spondylopathy, unspecified spinal region (H)  Does not want to go on a biologic medication due to risk for side effects and infection. I can understand his hesitation. He is generally able to get by with taking 400 mg of Advil in the morning but reports a flare every several weeks. His last script for Norco lasted him about 3 months. Will plan to refill today and can refill every 3 months as needed. Controlled substance agreement signed today.   - HYDROcodone-acetaminophen (NORCO) 5-325 MG per tablet; Take 1-2 tablets by mouth every 6 hours as needed for moderate to severe pain  Dispense: 40 tablet; Refill: 0  - Comprehensive metabolic panel  - CBC with platelets    Future Office visit:     Controlled substance agreement on file: Yes:  Date 9/19/17.     Processing:  Staff will hand deliver Rx to on-site pharmacy    [place] reviewed[place]  reviewed- no concerns    [place]:  [email or web address]      Routing refill request to provider for review/approval because:  Drug not on the [facility] refill protocol or controlled substance    Tami Dunn RN

## 2018-01-10 RX ORDER — HYDROCODONE BITARTRATE AND ACETAMINOPHEN 5; 325 MG/1; MG/1
1-2 TABLET ORAL EVERY 6 HOURS PRN
Qty: 40 TABLET | Refills: 0 | Status: SHIPPED | OUTPATIENT
Start: 2018-01-10 | End: 2018-01-23

## 2018-01-23 ENCOUNTER — TELEPHONE (OUTPATIENT)
Dept: FAMILY MEDICINE | Facility: CLINIC | Age: 58
End: 2018-01-23

## 2018-01-23 ENCOUNTER — OFFICE VISIT (OUTPATIENT)
Dept: FAMILY MEDICINE | Facility: CLINIC | Age: 58
End: 2018-01-23
Payer: COMMERCIAL

## 2018-01-23 VITALS
TEMPERATURE: 98.8 F | SYSTOLIC BLOOD PRESSURE: 122 MMHG | OXYGEN SATURATION: 97 % | WEIGHT: 179 LBS | BODY MASS INDEX: 26.82 KG/M2 | DIASTOLIC BLOOD PRESSURE: 82 MMHG | HEART RATE: 92 BPM

## 2018-01-23 DIAGNOSIS — R05.9 COUGH: Primary | ICD-10-CM

## 2018-01-23 DIAGNOSIS — J10.1 INFLUENZA A: ICD-10-CM

## 2018-01-23 LAB
FLUAV+FLUBV AG SPEC QL: NEGATIVE
FLUAV+FLUBV AG SPEC QL: POSITIVE
SPECIMEN SOURCE: ABNORMAL

## 2018-01-23 PROCEDURE — 99213 OFFICE O/P EST LOW 20 MIN: CPT | Performed by: PHYSICIAN ASSISTANT

## 2018-01-23 PROCEDURE — 87804 INFLUENZA ASSAY W/OPTIC: CPT | Performed by: PHYSICIAN ASSISTANT

## 2018-01-23 RX ORDER — OSELTAMIVIR PHOSPHATE 75 MG/1
75 CAPSULE ORAL 2 TIMES DAILY
Qty: 10 CAPSULE | Refills: 0 | Status: SHIPPED | OUTPATIENT
Start: 2018-01-23 | End: 2018-01-28

## 2018-01-23 RX ORDER — BENZONATATE 100 MG/1
100 CAPSULE ORAL 3 TIMES DAILY PRN
Qty: 30 CAPSULE | Refills: 0 | Status: SHIPPED | OUTPATIENT
Start: 2018-01-23 | End: 2019-06-14

## 2018-01-23 NOTE — NURSING NOTE
"Chief Complaint   Patient presents with     Cough       Initial /82  Pulse 92  Temp 98.8  F (37.1  C) (Tympanic)  Wt 179 lb (81.2 kg)  SpO2 97%  BMI 26.82 kg/m2 Estimated body mass index is 26.82 kg/(m^2) as calculated from the following:    Height as of 3/16/17: 5' 8.5\" (1.74 m).    Weight as of this encounter: 179 lb (81.2 kg).  Medication Reconciliation: complete   Tanya Balderrama CMA      "

## 2018-01-23 NOTE — TELEPHONE ENCOUNTER
Does the patient have any of the following?  Measured fever > 100 degrees Yes   Chills or feels very warm to the touch Yes   Cough Yes   Sore throat Yes   Muscle/ body aches Yes   Headaches No   Fatigue (tiredness) Yes     Patient called to schedule appointment, sent to triage for YELENA symptoms. States that he has above symptoms which started on Sunday. We discussed home care measures but patient is very concerned about pneumonia and would like to be seen. Appointment scheduled.     Marysol Her RN   [facility] - Triage

## 2018-01-23 NOTE — MR AVS SNAPSHOT
After Visit Summary   1/23/2018    Jonathan Barillas    MRN: [id number]           Patient Information     Date Of Birth          1960        Visit Information        Provider Department      1/23/2018 10:00 AM Gera Hackett PA-C [facility] [place]        Today's Diagnoses     Cough    -  1    Influenza A           Follow-ups after your visit        Follow-up notes from your care team     Return if symptoms worsen or fail to improve.      Who to contact     If you have questions or need follow up information about today's clinic visit or your schedule please contact [facility] [place] directly at [phone].  Normal or non-critical lab and imaging results will be communicated to you by MyChart, letter or phone within 4 business days after the clinic has received the results. If you do not hear from us within 7 days, please contact the clinic through StreetÂ LibraryÂ Networkhart or phone. If you have a critical or abnormal lab result, we will notify you by phone as soon as possible.  Submit refill requests through Lukkin or call your pharmacy and they will forward the refill request to us. Please allow 3 business days for your refill to be completed.          Additional Information About Your Visit        MyChart Information     Lukkin gives you secure access to your electronic health record. If you see a primary care provider, you can also send messages to your care team and make appointments. If you have questions, please call your primary care clinic.  If you do not have a primary care provider, please call [phone] and they will assist you.        Care EveryWhere ID     This is your Care EveryWhere ID. This could be used by other organizations to access your [place] medical records  [id number]        Your Vitals Were     Pulse Temperature Pulse Oximetry BMI (Body Mass Index)          92 98.8  F (37.1  C) (Tympanic) 97% 26.82 kg/m2         Blood Pressure from Last 3  Encounters:   01/23/18 122/82   09/19/17 (!) 148/92   07/06/17 144/86    Weight from Last 3 Encounters:   01/23/18 179 lb (81.2 kg)   09/19/17 176 lb (79.8 kg)   07/06/17 183 lb (83 kg)              We Performed the Following     Influenza A/B antigen          Today's Medication Changes          These changes are accurate as of: 1/23/18 10:56 AM.  If you have any questions, ask your nurse or doctor.               Start taking these medicines.        Dose/Directions    benzonatate 100 MG capsule   Commonly known as:  TESSALON   Used for:  Cough   Started by:  Gera Hackett PA-C        Dose:  100 mg   Take 1 capsule (100 mg) by mouth 3 times daily as needed   Quantity:  30 capsule   Refills:  0       oseltamivir 75 MG capsule   Commonly known as:  TAMIFLU   Used for:  Influenza A   Started by:  Gera Hackett PA-C        Dose:  75 mg   Take 1 capsule (75 mg) by mouth 2 times daily for 5 days   Quantity:  10 capsule   Refills:  0            Where to get your medicines      [facility] [place] [address] [address] [address]     Phone:  [phone]     benzonatate 100 MG capsule    oseltamivir 75 MG capsule                Primary Care Provider Office Phone # Fax #    Thuy Bustos [phone]       [address] [place] [address] [address]  [place] [address]        Equal Access to Services     [facility] AH: Hadii jaci webstero Sokirit, waaxda luqadaha, qaybta kaalmada adeegyada, bernice sarabia. So [facility] [phone].    ATENCIÓN: Si habla español, tiene a taylor disposición servicios gratuitos de asistencia lingüística. Llame al [phone].    We comply with applicable federal civil rights laws and [place] laws. We do not discriminate on the basis of race, color, national origin, age, disability, sex, sexual orientation, or gender identity.            Thank you!      Thank you for choosing [facility] [place]  for your care. Our goal is always to provide you with excellent care. Hearing back from our patients is one way we can continue to improve our services. Please take a few minutes to complete the written survey that you may receive in the mail after your visit with us. Thank you!             Your Updated Medication List - Protect others around you: Learn how to safely use, store and throw away your medicines at www.disposemymeds.org.          This list is accurate as of: 1/23/18 10:56 AM.  Always use your most recent med list.                   Brand Name Dispense Instructions for use Diagnosis    benzonatate 100 MG capsule    TESSALON    30 capsule    Take 1 capsule (100 mg) by mouth 3 times daily as needed    Cough       oseltamivir 75 MG capsule    TAMIFLU    10 capsule    Take 1 capsule (75 mg) by mouth 2 times daily for 5 days    Influenza A       sildenafil 100 MG tablet    VIAGRA    100 tablet    Take 1/2 to 1 tab 1 hr before intercourse.    Impotence of organic origin       VITAMIN C PO      Take 500 mg by mouth daily        VITAMIN D3 PO      Take 2,000 Units by mouth

## 2018-01-23 NOTE — PROGRESS NOTES
SUBJECTIVE:   Jonathan Barillas is a 57 year old male who presents to clinic today for the following health issues:      Acute Illness   Acute illness concerns: cough  Onset:  2 days    Fever: YES, subjective    Chills/Sweats: YES    Headache (location?): YES    Sinus Pressure:no    Conjunctivitis:  no    Ear Pain: no    Rhinorrhea: YES    Congestion: YES    Sore Throat: YES     Cough: YES-productive of clear sputum, productive of yellow sputum, productive of green sputum    Wheeze: no    Decreased Appetite: no    Nausea: no    Vomiting: no    Diarrhea:  no    Dysuria/Freq.: no    Fatigue/Achiness: YES    Sick/Strep Exposure: no     Therapies Tried and outcome: nyquil, ibuprofen       Last motrin 2 hours ago.       Problem list and histories reviewed & adjusted, as indicated.  Additional history: as documented    Patient Active Problem List   Diagnosis     Inflammatory spondylopathy (H)     Vitamin D deficiency     CARDIOVASCULAR SCREENING; LDL GOAL LESS THAN 130     Chronic diarrhea     History of tobacco abuse     Hypertension goal BP (blood pressure) < 140/80     Acute gallstone pancreatitis     Generalized anxiety disorder     Iritis     Ankylosing spondylitis (H)     Ganglion cyst of finger of left hand     Controlled substance agreement signed     Other chronic pain     Past Surgical History:   Procedure Laterality Date     ENDOSCOPIC ULTRASOUND LOWER GASTROINTESTIONAL TRACT (GI) N/A 12/9/2015    Procedure: ENDOSCOPIC ULTRASOUND LOWER GASTROINTESTIONAL TRACT (GI);  Surgeon: Flaquito Crawford MD;  Location:  OR     EXCISE MASS FINGER Left 7/18/2016    Procedure: EXCISE MASS FINGER;  Surgeon: Maikol Meier MD;  Location:  OR     LAPAROSCOPIC CHOLECYSTECTOMY N/A 12/11/2015    Procedure: LAPAROSCOPIC CHOLECYSTECTOMY;  Surgeon: Romain Cervantes MD;  Location:  OR       Social History   Substance Use Topics     Smoking status: Former Smoker     Packs/day: 1.00     Years: 20.00     Types:  Cigarettes     Quit date: 1/1/2016     Smokeless tobacco: Never Used     Alcohol use 0.0 oz/week     0 Standard drinks or equivalent per week      Comment: 4 drinks per week     Family History   Problem Relation Age of Onset     Autoimmune Disease Other      Neurologic Disorder Cousin      ALS     Family History Negative No family hx of          Current Outpatient Prescriptions   Medication Sig Dispense Refill     Cholecalciferol (VITAMIN D3 PO) Take 2,000 Units by mouth       Ascorbic Acid (VITAMIN C PO) Take 500 mg by mouth daily       sildenafil (VIAGRA) 100 MG tablet Take 1/2 to 1 tab 1 hr before intercourse. 100 tablet 0     Allergies   Allergen Reactions     Penicillin [Penicillins] Swelling         Reviewed and updated as needed this visit by clinical staff     Reviewed and updated as needed this visit by Provider         ROS:  Constitutional, HEENT, cardiovascular, pulmonary, gi and gu systems are negative, except as otherwise noted.      OBJECTIVE:   /82  Pulse 92  Temp 98.8  F (37.1  C) (Tympanic)  Wt 179 lb (81.2 kg)  SpO2 97%  BMI 26.82 kg/m2  Body mass index is 26.82 kg/(m^2).  GENERAL: healthy, alert and no distress  EYES: Eyes grossly normal to inspection  HENT: ear canals and TM's normal, nose and mouth without ulcers or lesions  NECK: no adenopathy  RESP: mild scattered wheezing throughout lung fields, no crackles noted  CV: regular rate and rhythm, normal S1 S2, no S3 or S4, no murmur    Diagnostic Test Results:  none     ASSESSMENT/PLAN:       1. Influenza A  Flu A positive, advise that he stay home from work until he is fever free x 24 hours, drink fluids and rest.  He is in the window for Tamiflu to be effective, will start Tamiflu BId x 5 days.  - oseltamivir (TAMIFLU) 75 MG capsule; Take 1 capsule (75 mg) by mouth 2 times daily for 5 days  Dispense: 10 capsule; Refill: 0    1. Cough  - Influenza A/B antigen    Follow-Up:RTC if new or worsening symptoms    Gera Hackett,  ROSALIO  [facility]

## 2018-04-26 DIAGNOSIS — M45.9 ANKYLOSING SPONDYLITIS, UNSPECIFIED SITE OF SPINE (H): Primary | ICD-10-CM

## 2018-04-26 RX ORDER — HYDROCODONE BITARTRATE AND ACETAMINOPHEN 5; 325 MG/1; MG/1
TABLET ORAL
COMMUNITY
Start: 2017-09-19 | End: 2018-04-26

## 2018-04-26 NOTE — TELEPHONE ENCOUNTER
Controlled Substance Refill Request for norco  Problem List Complete:  No     PROVIDER TO CONSIDER COMPLETION OF PROBLEM LIST AND OVERVIEW/CONTROLLED SUBSTANCE AGREEMENT    Last Written Prescription Date:  1/10/18  Last Fill Quantity: 40,   # refills: 0    Last Office Visit with [facility] primary care provider: 1/23/18    Future Office visit:     Controlled substance agreement on file: Yes:  Date 9/19/17.     Processing:  Patient will  in clinic   checked in past 6 months?  Yes 1/9/18     Marysol Her RN   [facility] - Triage

## 2018-04-27 RX ORDER — HYDROCODONE BITARTRATE AND ACETAMINOPHEN 5; 325 MG/1; MG/1
1 TABLET ORAL EVERY 6 HOURS PRN
Qty: 20 TABLET | Refills: 0 | Status: SHIPPED | OUTPATIENT
Start: 2018-04-27 | End: 2018-11-17

## 2018-11-17 ENCOUNTER — MYC REFILL (OUTPATIENT)
Dept: FAMILY MEDICINE | Facility: CLINIC | Age: 58
End: 2018-11-17

## 2018-11-17 DIAGNOSIS — M45.9 ANKYLOSING SPONDYLITIS, UNSPECIFIED SITE OF SPINE (H): ICD-10-CM

## 2018-11-19 RX ORDER — HYDROCODONE BITARTRATE AND ACETAMINOPHEN 5; 325 MG/1; MG/1
1 TABLET ORAL EVERY 6 HOURS PRN
Qty: 20 TABLET | Refills: 0 | Status: SHIPPED | OUTPATIENT
Start: 2018-11-19 | End: 2019-03-07

## 2018-11-19 NOTE — TELEPHONE ENCOUNTER
Prescription for norco delivered to  EP pharmacy and My Chart message sent to the pt.  Reba Astorga,

## 2018-11-19 NOTE — TELEPHONE ENCOUNTER
Message from [employer]:  Original authorizing provider: Thuy Bustos MD    Jonathan Barillas would like a refill of the following medications:  HYDROcodone-acetaminophen (NORCO) 5-325 MG per tablet [Thuy Bustos MD]    [place] [facility] [place] [address]    Comment:  The last time I refilled this it was only for a qty of 20, when it is supposed to be for 40. Thanks.

## 2018-11-19 NOTE — TELEPHONE ENCOUNTER
Last Written Prescription Date:  4/27/18  Last Fill Quantity: 20,  # refills: 0   Last office visit: 1/23/2018 with prescribing provider:     Future Office Visit:    Requested Prescriptions   Pending Prescriptions Disp Refills     HYDROcodone-acetaminophen (NORCO) 5-325 MG per tablet 20 tablet 0     Sig: Take 1 tablet by mouth every 6 hours as needed for severe pain    There is no refill protocol information for this order

## 2018-11-19 NOTE — TELEPHONE ENCOUNTER
Controlled Substance Refill Request for norco  Problem List Complete:  Yes   checked in past 3 months?  Yes 11/19/2018  Noted:  9/20/2017       Priority:  Medium      Overview Addendum 9/20/2017  1:03 PM by Thuy Bustos MD     Patient is followed by Thuy Bustos MD for ongoing prescription of pain medication.  All refills should only be approved by this provider, or covering partner.     Medication(s): Norco 5-325 mg.   Maximum quantity per month: #40 every 3 months  Clinic visit frequency required: Q 6  months      Controlled substance agreement: Completed 9/19/2017      Encounter-Level CSA:      There are no encounter-level csa.       Pain Clinic evaluation in the past: No     DIRE Total Score(s):  No flowsheet data found.     Last [facility] website verification:  11/19/2019   [email or web address]

## 2019-03-07 ENCOUNTER — MYC REFILL (OUTPATIENT)
Dept: FAMILY MEDICINE | Facility: CLINIC | Age: 59
End: 2019-03-07

## 2019-03-07 DIAGNOSIS — M45.9 ANKYLOSING SPONDYLITIS, UNSPECIFIED SITE OF SPINE (H): ICD-10-CM

## 2019-03-07 NOTE — TELEPHONE ENCOUNTER
Controlled Substance Refill Request for norco  Problem List Complete:  Yes    Last Written Prescription Date:  11/19/18  Last Fill Quantity: 20,   # refills: 0      Last Office Visit with [facility] primary care provider: 1/23/18    Future Office visit:     Controlled substance agreement:   Encounter-Level CSA - 09/19/2017:    Controlled Substance Agreement - Scan on 9/21/2017  9:26 AM: CONTROLLED SUBSTANCE AGREEMENT (below)       Patient-Level CSA:    There are no patient-level csa.         Last Urine Drug Screen: No results found for: CDAUT, No results found for: COMDAT, No results found for: THC13, PCP13, COC13, MAMP13, OPI13, AMP13, BZO13, TCA13, MTD13, BAR13, OXY13, PPX13, BUP13     Processing:  Patient will  in clinic    [email or web address]   checked in past 3 months?  No, route to RN     [place] reviewed:  reviewed- no concerns    [place]  [email or web address]    Marysol Her RN   [facility] - Triage

## 2019-03-08 RX ORDER — HYDROCODONE BITARTRATE AND ACETAMINOPHEN 5; 325 MG/1; MG/1
1 TABLET ORAL EVERY 6 HOURS PRN
Qty: 20 TABLET | Refills: 0 | Status: SHIPPED | OUTPATIENT
Start: 2019-03-08 | End: 2019-06-10

## 2019-03-08 NOTE — TELEPHONE ENCOUNTER
Prescription for norco has been delivered to the [facility].        .Silke BURNETTE    [facility]

## 2019-06-14 ENCOUNTER — OFFICE VISIT (OUTPATIENT)
Dept: FAMILY MEDICINE | Facility: CLINIC | Age: 59
End: 2019-06-14
Payer: COMMERCIAL

## 2019-06-14 VITALS
BODY MASS INDEX: 25.34 KG/M2 | HEIGHT: 70 IN | WEIGHT: 177 LBS | RESPIRATION RATE: 16 BRPM | DIASTOLIC BLOOD PRESSURE: 84 MMHG | SYSTOLIC BLOOD PRESSURE: 120 MMHG | TEMPERATURE: 97.4 F | OXYGEN SATURATION: 96 % | HEART RATE: 85 BPM

## 2019-06-14 DIAGNOSIS — Z79.899 CONTROLLED SUBSTANCE AGREEMENT SIGNED: Primary | ICD-10-CM

## 2019-06-14 DIAGNOSIS — M45.0 ANKYLOSING SPONDYLITIS OF MULTIPLE SITES IN SPINE (H): ICD-10-CM

## 2019-06-14 DIAGNOSIS — G89.29 OTHER CHRONIC PAIN: ICD-10-CM

## 2019-06-14 PROCEDURE — 99214 OFFICE O/P EST MOD 30 MIN: CPT | Performed by: PHYSICIAN ASSISTANT

## 2019-06-14 RX ORDER — HYDROCODONE BITARTRATE AND ACETAMINOPHEN 5; 325 MG/1; MG/1
1 TABLET ORAL EVERY 6 HOURS PRN
Qty: 20 TABLET | Refills: 0 | Status: SHIPPED | OUTPATIENT
Start: 2019-06-14 | End: 2020-01-10

## 2019-06-14 ASSESSMENT — ANXIETY QUESTIONNAIRES
2. NOT BEING ABLE TO STOP OR CONTROL WORRYING: NOT AT ALL
5. BEING SO RESTLESS THAT IT IS HARD TO SIT STILL: NOT AT ALL
1. FEELING NERVOUS, ANXIOUS, OR ON EDGE: NOT AT ALL
7. FEELING AFRAID AS IF SOMETHING AWFUL MIGHT HAPPEN: NOT AT ALL
3. WORRYING TOO MUCH ABOUT DIFFERENT THINGS: NOT AT ALL
GAD7 TOTAL SCORE: 0
6. BECOMING EASILY ANNOYED OR IRRITABLE: NOT AT ALL

## 2019-06-14 ASSESSMENT — MIFFLIN-ST. JEOR: SCORE: 1616.18

## 2019-06-14 ASSESSMENT — PATIENT HEALTH QUESTIONNAIRE - PHQ9
5. POOR APPETITE OR OVEREATING: NOT AT ALL
SUM OF ALL RESPONSES TO PHQ QUESTIONS 1-9: 0

## 2019-06-14 NOTE — PROGRESS NOTES
"Subjective     Jonathan Barillas is a 58 year old male who presents to clinic today for the following health issues:    HPI   Medication Followup of hydrocodone    Taking Medication as prescribed: yes    Side Effects:  None    Medication Helping Symptoms:  yes     Jonathan has a hx of ankylosing spondylitis.  He has been followed I the past by a rheumatologist, but does not want to take biologic medication to treat this.  He generally uses Norco for flares of his  back pain. He uses this about 5-6 days in a row per month when he has  \"A bad week\".  He is due for a refill today. Pain is currently well controlled      Shoulder pain:  Hx of right rotator cuff tear, has         Patient Active Problem List   Diagnosis     Inflammatory spondylopathy (H)     Vitamin D deficiency     CARDIOVASCULAR SCREENING; LDL GOAL LESS THAN 130     Chronic diarrhea     History of tobacco abuse     Hypertension goal BP (blood pressure) < 140/80     Acute gallstone pancreatitis     Generalized anxiety disorder     Iritis     Ankylosing spondylitis (H)     Ganglion cyst of finger of left hand     Controlled substance agreement signed     Other chronic pain     Past Surgical History:   Procedure Laterality Date     ENDOSCOPIC ULTRASOUND LOWER GASTROINTESTIONAL TRACT (GI) N/A 12/9/2015    Procedure: ENDOSCOPIC ULTRASOUND LOWER GASTROINTESTIONAL TRACT (GI);  Surgeon: Flaquito Crawford MD;  Location:  OR     EXCISE MASS FINGER Left 7/18/2016    Procedure: EXCISE MASS FINGER;  Surgeon: Maikol Meier MD;  Location:  OR     LAPAROSCOPIC CHOLECYSTECTOMY N/A 12/11/2015    Procedure: LAPAROSCOPIC CHOLECYSTECTOMY;  Surgeon: Romain Cervantes MD;  Location:  OR       Social History     Tobacco Use     Smoking status: Former Smoker     Packs/day: 1.00     Years: 20.00     Pack years: 20.00     Types: Cigarettes     Last attempt to quit: 1/1/2016     Years since quitting: 3.4     Smokeless tobacco: Never Used   Substance Use Topics "     Alcohol use: Yes     Alcohol/week: 0.0 oz     Comment: 4 drinks per week     Family History   Problem Relation Age of Onset     Autoimmune Disease Other      Neurologic Disorder Cousin         ALS     Family History Negative No family hx of          Current Outpatient Medications   Medication Sig Dispense Refill     Ascorbic Acid (VITAMIN C PO) Take 500 mg by mouth daily       Cholecalciferol (VITAMIN D3 PO) Take 2,000 Units by mouth       HYDROcodone-acetaminophen (NORCO) 5-325 MG tablet Take 1 tablet by mouth every 6 hours as needed for severe pain 20 tablet 0     Allergies   Allergen Reactions     Penicillin [Penicillins] Swelling       Reviewed and updated as needed this visit by Provider         Review of Systems   ROS COMP: Constitutional, HEENT, cardiovascular, pulmonary, gi and gu systems are negative, except as otherwise noted.      Objective    There were no vitals taken for this visit.  There is no height or weight on file to calculate BMI.  Physical Exam   GENERAL: healthy, alert and no distress  RESP: lungs clear to auscultation - no rales, rhonchi or wheezes  CV: regular rate and rhythm, normal S1 S2, no S3 or S4, no murmur, click or rub, no peripheral edema and peripheral pulses strong  PSYCH: mentation appears normal, affect normal/bright    Diagnostic Test Results:  none         Assessment & Plan     1. Ankylosing spondylitis of multiple sites in spine (H)  Symptoms well controlled with PRN norco.  He seems to be using this appropriately.  Last CSA was 2017, I have advised that he sign this at his next visit with his PCP who is out of the office today.  He is agreeable.  Refills given    2. Controlled substance agreement signed  Due for drug screen  - Drug  Screen Comprehensive , Urine with Reported Meds (MedTox) (Pain Care Package); Future    3. Other chronic pain  - Drug  Screen Comprehensive , Urine with Reported Meds (MedTox) (Pain Care Package); Future     BMI:   Estimated body mass index  "is 25.76 kg/m  as calculated from the following:    Height as of this encounter: 1.765 m (5' 9.5\").    Weight as of this encounter: 80.3 kg (177 lb).            follow up in 3 months for annual exam and pain follow up with PCP    No follow-ups on file.    Gera Hackett PA-C  [facility]      "

## 2019-06-15 ASSESSMENT — ANXIETY QUESTIONNAIRES: GAD7 TOTAL SCORE: 0

## 2019-06-19 NOTE — PROGRESS NOTES
[facility]   Clinic Visit s Jun 21, 2019    PCP: Thuy Bustos is a 58 year old male who is seen as self referral for   Chief Complaint   Patient presents with     Right Shoulder - Pain     Left Shoulder - Pain       Injury: Patient describes injury pain that began after playing golf.      Left hand dominant    Location of Pain: bilateral shoulder lateral, nonradiating right > left   Duration of Pain: 3 week(s)  Rating of Pain at worst: 7/10  Rating of Pain Currently: 4/10  Pain is better with: activity avoidance   Pain is worse with: carrying (trash, groceries, laundry) and self care  Treatment so far consists of: home exercises  Associated symptoms: no distal numbness or tingling; denies swelling or warmth  Recent imaging completed: No recent imaging completed.  Prior History of related problems: MRI for both shoulder 2016    Surgical consult for LEFT shoulder in 2016 - not surgical d/t atrophy    Social History: is employed as a/an office work       Past Medical History:   Diagnosis Date     Ankylosing spondylitis (H)      Ankylosing spondylitis (H)      Arthritis      ED (erectile dysfunction)      Gallstones      Hypertension goal BP (blood pressure) < 140/80 10/24/2014     Iritis      Other chronic pain        Patient Active Problem List    Diagnosis Date Noted     Controlled substance agreement signed 09/20/2017     Priority: Medium     Other chronic pain 09/20/2017     Priority: Medium     Patient is followed by Thuy Bustos MD for ongoing prescription of pain medication.  All refills should only be approved by this provider, or covering partner.    Medication(s): Norco 5-325 mg.   Maximum quantity per month: #40 every 3 months  Clinic visit frequency required: Q 6  months     Controlled substance agreement: Completed 9/19/2017  Encounter-Level CSA:     There are no encounter-level csa.        Pain Clinic evaluation in the past: No    DIRE Total  Score(s):  No flowsheet data found.    Last [facility] website verification:  none   [email or web address]         Ankylosing spondylitis (H) 06/29/2016     Priority: Medium     Ganglion cyst of finger of left hand 06/29/2016     Priority: Medium     Iritis      Priority: Medium     Generalized anxiety disorder 05/19/2016     Priority: Medium     Acute gallstone pancreatitis 12/09/2015     Priority: Medium     Hypertension goal BP (blood pressure) < 140/80 10/24/2014     Priority: Medium     History of tobacco abuse 09/27/2013     Priority: Medium     Chronic diarrhea 06/21/2011     Priority: Medium     CARDIOVASCULAR SCREENING; LDL GOAL LESS THAN 130 10/31/2010     Priority: Medium     Vitamin D deficiency 01/05/2010     Priority: Medium     Problem list name updated by automated process. Provider to review       Inflammatory spondylopathy (H) 02/27/2004     Priority: Medium     Problem list name updated by automated process. Provider to review         Family History   Problem Relation Age of Onset     Autoimmune Disease Other      Neurologic Disorder Cousin         ALS     Family History Negative No family hx of        Social History     Socioeconomic History     Marital status:      Spouse name: Génesis     Number of children: 4     Years of education: Not on file     Highest education level: Not on file   Occupational History     Not on file   Social Needs     Financial resource strain: Not on file     Food insecurity:     Worry: Not on file     Inability: Not on file     Transportation needs:     Medical: Not on file     Non-medical: Not on file   Tobacco Use     Smoking status: Former Smoker     Packs/day: 1.00     Years: 20.00     Pack years: 20.00     Types: Cigarettes     Last attempt to quit: 1/1/2016     Years since quitting: 3.4     Smokeless tobacco: Never Used   Substance and Sexual Activity     Alcohol use: Yes     Alcohol/week: 0.0 oz     Comment: 4 drinks per week     Drug use: No  "      Past Surgical History:   Procedure Laterality Date     ENDOSCOPIC ULTRASOUND LOWER GASTROINTESTIONAL TRACT (GI) N/A 12/9/2015    Procedure: ENDOSCOPIC ULTRASOUND LOWER GASTROINTESTIONAL TRACT (GI);  Surgeon: Flaquito Crawford MD;  Location:  OR     EXCISE MASS FINGER Left 7/18/2016    Procedure: EXCISE MASS FINGER;  Surgeon: Maikol Meier MD;  Location:  OR     LAPAROSCOPIC CHOLECYSTECTOMY N/A 12/11/2015    Procedure: LAPAROSCOPIC CHOLECYSTECTOMY;  Surgeon: Romain Cervantes MD;  Location:  OR             Review of Systems   Musculoskeletal: Positive for joint pain.   All other systems reviewed and are negative.        Physical Exam  BP (!) 144/100   Ht 1.765 m (5' 9.5\")   Wt 80.3 kg (177 lb)   BMI 25.76 kg/m    Constitutional:well-developed, well-nourished, and in no distress.   Cardiovascular: Intact distal pulses.    Neurological: alert. Gait Normal:   Gait, station, stance, and balance appear normal for age  Skin: Skin is warm and dry.   Psychiatric: Mood and affect normal.   Respiratory: unlabored, speaks in full sentences  Lymph: no LAD, no lymphangitis            Right Shoulder Exam     Tenderness   The patient is experiencing no tenderness.    Range of Motion   Active abduction: 170   Passive abduction: 170   Extension: normal   External rotation: 90   Forward flexion: 170   Internal rotation 0 degrees: Lumbar     Muscle Strength   Abduction: 4/5   Internal rotation: 5/5   External rotation: 5/5   Supraspinatus: 3/5   Subscapularis: 5/5   Biceps: 5/5     Tests   Moya test: positive  Cross arm: negative  Impingement: positive  Drop arm: positive      Left Shoulder Exam     Tenderness   The patient is experiencing no tenderness.     Range of Motion   Active abduction: 160   Passive abduction: 160   Extension: normal   External rotation: normal   Forward flexion: 160   Internal rotation 0 degrees: Lumbar     Muscle Strength   Abduction: 4/5   Internal rotation: 4/5   External " rotation: 5/5   Supraspinatus: 3/5   Subscapularis: 4/5   Biceps: 5/5     Tests   Moya test: negative  Cross arm: negative  Impingement: negative  Drop arm: positive          X-ray images Ordered and independently reviewed by me in the office today with the patient. X-ray shows:   No results found for this or any previous visit (from the past 744 hour(s)).              ASSESSMENT/PLAN    ICD-10-CM    1. Chronic pain of both shoulders M25.511 XR Shoulder Bilateral 2 vw    G89.29 Large Joint Injection/Arthocentesis: bilateral subacromial bursa    M25.512    2. Nontraumatic complete tear of left rotator cuff M75.122 [facility]     Large Joint Injection/Arthocentesis: bilateral subacromial bursa   3. Rotator cuff tendinitis, right M75.81 [facility]     Large Joint Injection/Arthocentesis: bilateral subacromial bursa       Left complete rotator cuff tear noted on MRI a few years ago, deemed nonsurgical.  The right side is intact but shows moderate tendinopathy.  Significant pain about both shoulders.  Has not had physical therapy.  Offered a one-time cortisone injection for the right shoulder, and a cortisone injection for the left which could be repeated as needed no sooner than every 3 to 4 months for pain control as it is otherwise nonoperative.  Risk of progression with repeated cortisone injections to rotator cuff tear on the right discussed.  He agrees to physical therapy for both shoulders as well.    Large Joint Injection/Arthocentesis: bilateral subacromial bursa  Date/Time: 6/21/2019 11:14 AM  Performed by: Narciso Cobb MD  Authorized by: Narciso Cobb MD     Indications:  Pain  Needle Size:  25 G  Guidance: landmark guided    Approach:  Posterolateral  Location:  Shoulder  Laterality:  Bilateral      Site:  Bilateral subacromial bursa  Medications (Right):  40 mg triamcinolone 40 MG/ML; 4 mL lidocaine 1 %; 5 mL lidocaine 1  %  Medications (Left):  40 mg triamcinolone 40 MG/ML; 4 mL lidocaine 1 %; 5 mL lidocaine 1 %  Outcome:  Tolerated well, no immediate complications  Procedure discussed: discussed risks, benefits, and alternatives    Timeout: timeout called immediately prior to procedure    Prep: patient was prepped and draped in usual sterile fashion     Lot: [id number] EXP: 05/2020

## 2019-06-21 ENCOUNTER — ANCILLARY PROCEDURE (OUTPATIENT)
Dept: GENERAL RADIOLOGY | Facility: CLINIC | Age: 59
End: 2019-06-21
Attending: FAMILY MEDICINE
Payer: COMMERCIAL

## 2019-06-21 ENCOUNTER — OFFICE VISIT (OUTPATIENT)
Dept: ORTHOPEDICS | Facility: CLINIC | Age: 59
End: 2019-06-21
Payer: COMMERCIAL

## 2019-06-21 VITALS
HEIGHT: 70 IN | BODY MASS INDEX: 25.34 KG/M2 | WEIGHT: 177 LBS | DIASTOLIC BLOOD PRESSURE: 100 MMHG | SYSTOLIC BLOOD PRESSURE: 144 MMHG

## 2019-06-21 DIAGNOSIS — M25.512 CHRONIC PAIN OF BOTH SHOULDERS: Primary | ICD-10-CM

## 2019-06-21 DIAGNOSIS — M75.81 ROTATOR CUFF TENDINITIS, RIGHT: ICD-10-CM

## 2019-06-21 DIAGNOSIS — M25.511 CHRONIC PAIN OF BOTH SHOULDERS: Primary | ICD-10-CM

## 2019-06-21 DIAGNOSIS — M75.122 NONTRAUMATIC COMPLETE TEAR OF LEFT ROTATOR CUFF: ICD-10-CM

## 2019-06-21 DIAGNOSIS — G89.29 CHRONIC PAIN OF BOTH SHOULDERS: Primary | ICD-10-CM

## 2019-06-21 PROCEDURE — 99204 OFFICE O/P NEW MOD 45 MIN: CPT | Performed by: FAMILY MEDICINE

## 2019-06-21 PROCEDURE — 20610 DRAIN/INJ JOINT/BURSA W/O US: CPT | Mod: 50 | Performed by: FAMILY MEDICINE

## 2019-06-21 PROCEDURE — 73030 X-RAY EXAM OF SHOULDER: CPT | Mod: 59

## 2019-06-21 RX ADMIN — LIDOCAINE HYDROCHLORIDE 5 ML: 10 INJECTION, SOLUTION INFILTRATION; PERINEURAL at 11:14

## 2019-06-21 RX ADMIN — LIDOCAINE HYDROCHLORIDE 4 ML: 10 INJECTION, SOLUTION INFILTRATION; PERINEURAL at 11:14

## 2019-06-21 RX ADMIN — TRIAMCINOLONE ACETONIDE 40 MG: 40 INJECTION, SUSPENSION INTRA-ARTICULAR; INTRAMUSCULAR at 11:14

## 2019-06-21 ASSESSMENT — MIFFLIN-ST. JEOR: SCORE: 1616.18

## 2019-06-21 NOTE — LETTER
6/21/2019         RE: Jonathan Barillas  [address] [facility] [address]  [place] [address]        Dear Colleague,    Thank you for referring your patient, Jonathan Barillas, to the [facility] [place]. Please see a copy of my visit note below.    HPI   [facility]   Clinic Visit s Jun 21, 2019    PCP: Thuy Bustos is a 58 year old male who is seen as self referral for   Chief Complaint   Patient presents with     Right Shoulder - Pain     Left Shoulder - Pain       Injury: Patient describes injury pain that began after playing golf.      Left hand dominant    Location of Pain: bilateral shoulder lateral, nonradiating right > left   Duration of Pain: 3 week(s)  Rating of Pain at worst: 7/10  Rating of Pain Currently: 4/10  Pain is better with: activity avoidance   Pain is worse with: carrying (trash, groceries, laundry) and self care  Treatment so far consists of: home exercises  Associated symptoms: no distal numbness or tingling; denies swelling or warmth  Recent imaging completed: No recent imaging completed.  Prior History of related problems: MRI for both shoulder 2016    Surgical consult for LEFT shoulder in 2016 - not surgical d/t atrophy    Social History: is employed as a/an office work       Past Medical History:   Diagnosis Date     Ankylosing spondylitis (H)      Ankylosing spondylitis (H)      Arthritis      ED (erectile dysfunction)      Gallstones      Hypertension goal BP (blood pressure) < 140/80 10/24/2014     Iritis      Other chronic pain        Patient Active Problem List    Diagnosis Date Noted     Controlled substance agreement signed 09/20/2017     Priority: Medium     Other chronic pain 09/20/2017     Priority: Medium     Patient is followed by Thuy Bustos MD for ongoing prescription of pain medication.  All refills should only be approved by this provider, or covering partner.    Medication(s): Norco 5-325 mg.   Maximum  quantity per month: #40 every 3 months  Clinic visit frequency required: Q 6  months     Controlled substance agreement: Completed 9/19/2017  Encounter-Level CSA:     There are no encounter-level csa.        Pain Clinic evaluation in the past: No    DIRE Total Score(s):  No flowsheet data found.    Last [facility] website verification:  none   [email or web address]         Ankylosing spondylitis (H) 06/29/2016     Priority: Medium     Ganglion cyst of finger of left hand 06/29/2016     Priority: Medium     Iritis      Priority: Medium     Generalized anxiety disorder 05/19/2016     Priority: Medium     Acute gallstone pancreatitis 12/09/2015     Priority: Medium     Hypertension goal BP (blood pressure) < 140/80 10/24/2014     Priority: Medium     History of tobacco abuse 09/27/2013     Priority: Medium     Chronic diarrhea 06/21/2011     Priority: Medium     CARDIOVASCULAR SCREENING; LDL GOAL LESS THAN 130 10/31/2010     Priority: Medium     Vitamin D deficiency 01/05/2010     Priority: Medium     Problem list name updated by automated process. Provider to review       Inflammatory spondylopathy (H) 02/27/2004     Priority: Medium     Problem list name updated by automated process. Provider to review         Family History   Problem Relation Age of Onset     Autoimmune Disease Other      Neurologic Disorder Cousin         ALS     Family History Negative No family hx of        Social History     Socioeconomic History     Marital status:      Spouse name: Génesis     Number of children: 4     Years of education: Not on file     Highest education level: Not on file   Occupational History     Not on file   Social Needs     Financial resource strain: Not on file     Food insecurity:     Worry: Not on file     Inability: Not on file     Transportation needs:     Medical: Not on file     Non-medical: Not on file   Tobacco Use     Smoking status: Former Smoker     Packs/day: 1.00     Years: 20.00     Pack years:  "20.00     Types: Cigarettes     Last attempt to quit: 1/1/2016     Years since quitting: 3.4     Smokeless tobacco: Never Used   Substance and Sexual Activity     Alcohol use: Yes     Alcohol/week: 0.0 oz     Comment: 4 drinks per week     Drug use: No       Past Surgical History:   Procedure Laterality Date     ENDOSCOPIC ULTRASOUND LOWER GASTROINTESTIONAL TRACT (GI) N/A 12/9/2015    Procedure: ENDOSCOPIC ULTRASOUND LOWER GASTROINTESTIONAL TRACT (GI);  Surgeon: Flaquito Crawford MD;  Location: [facility] OR     EXCISE MASS FINGER Left 7/18/2016    Procedure: EXCISE MASS FINGER;  Surgeon: Maikol Meier MD;  Location: [facility] OR     LAPAROSCOPIC CHOLECYSTECTOMY N/A 12/11/2015    Procedure: LAPAROSCOPIC CHOLECYSTECTOMY;  Surgeon: Romain Cervantes MD;  Location: [facility] OR             Review of Systems   Musculoskeletal: Positive for joint pain.   All other systems reviewed and are negative.        Physical Exam  BP (!) 144/100   Ht 1.765 m (5' 9.5\")   Wt 80.3 kg (177 lb)   BMI 25.76 kg/m     Constitutional:well-developed, well-nourished, and in no distress.   Cardiovascular: Intact distal pulses.    Neurological: alert. Gait Normal:   Gait, station, stance, and balance appear normal for age  Skin: Skin is warm and dry.   Psychiatric: Mood and affect normal.   Respiratory: unlabored, speaks in full sentences  Lymph: no LAD, no lymphangitis            Right Shoulder Exam     Tenderness   The patient is experiencing no tenderness.    Range of Motion   Active abduction: 170   Passive abduction: 170   Extension: normal   External rotation: 90   Forward flexion: 170   Internal rotation 0 degrees: Lumbar     Muscle Strength   Abduction: 4/5   Internal rotation: 5/5   External rotation: 5/5   Supraspinatus: 3/5   Subscapularis: 5/5   Biceps: 5/5     Tests   Moya test: positive  Cross arm: negative  Impingement: positive  Drop arm: positive      Left Shoulder Exam     Tenderness   The patient is experiencing no " tenderness.     Range of Motion   Active abduction: 160   Passive abduction: 160   Extension: normal   External rotation: normal   Forward flexion: 160   Internal rotation 0 degrees: Lumbar     Muscle Strength   Abduction: 4/5   Internal rotation: 4/5   External rotation: 5/5   Supraspinatus: 3/5   Subscapularis: 4/5   Biceps: 5/5     Tests   Moya test: negative  Cross arm: negative  Impingement: negative  Drop arm: positive          X-ray images Ordered and independently reviewed by me in the office today with the patient. X-ray shows:   No results found for this or any previous visit (from the past 744 hour(s)).              ASSESSMENT/PLAN    ICD-10-CM    1. Chronic pain of both shoulders M25.511 XR Shoulder Bilateral 2 vw    G89.29 Large Joint Injection/Arthocentesis: bilateral subacromial bursa    M25.512    2. Nontraumatic complete tear of left rotator cuff M75.122 [facility]     Large Joint Injection/Arthocentesis: bilateral subacromial bursa   3. Rotator cuff tendinitis, right M75.81 [facility]     Large Joint Injection/Arthocentesis: bilateral subacromial bursa       Left complete rotator cuff tear noted on MRI a few years ago, deemed nonsurgical.  The right side is intact but shows moderate tendinopathy.  Significant pain about both shoulders.  Has not had physical therapy.  Offered a one-time cortisone injection for the right shoulder, and a cortisone injection for the left which could be repeated as needed no sooner than every 3 to 4 months for pain control as it is otherwise nonoperative.  Risk of progression with repeated cortisone injections to rotator cuff tear on the right discussed.  He agrees to physical therapy for both shoulders as well.    Large Joint Injection/Arthocentesis: bilateral subacromial bursa  Date/Time: 6/21/2019 11:14 AM  Performed by: Narciso Cobb MD  Authorized by: Narciso Cobb MD      Indications:  Pain  Needle Size:  25 G  Guidance: landmark guided    Approach:  Posterolateral  Location:  Shoulder  Laterality:  Bilateral      Site:  Bilateral subacromial bursa  Medications (Right):  40 mg triamcinolone 40 MG/ML; 4 mL lidocaine 1 %; 5 mL lidocaine 1 %  Medications (Left):  40 mg triamcinolone 40 MG/ML; 4 mL lidocaine 1 %; 5 mL lidocaine 1 %  Outcome:  Tolerated well, no immediate complications  Procedure discussed: discussed risks, benefits, and alternatives    Timeout: timeout called immediately prior to procedure    Prep: patient was prepped and draped in usual sterile fashion     Lot: [id number] EXP: 05/2020            Again, thank you for allowing me to participate in the care of your patient.        Sincerely,        Narciso Cobb MD

## 2019-06-23 RX ORDER — LIDOCAINE HYDROCHLORIDE 10 MG/ML
4 INJECTION, SOLUTION INFILTRATION; PERINEURAL
Status: SHIPPED | OUTPATIENT
Start: 2019-06-21

## 2019-06-23 RX ORDER — LIDOCAINE HYDROCHLORIDE 10 MG/ML
5 INJECTION, SOLUTION INFILTRATION; PERINEURAL
Status: SHIPPED | OUTPATIENT
Start: 2019-06-21

## 2019-06-23 RX ORDER — TRIAMCINOLONE ACETONIDE 40 MG/ML
40 INJECTION, SUSPENSION INTRA-ARTICULAR; INTRAMUSCULAR
Status: SHIPPED | OUTPATIENT
Start: 2019-06-21

## 2019-06-27 ENCOUNTER — THERAPY VISIT (OUTPATIENT)
Dept: PHYSICAL THERAPY | Facility: CLINIC | Age: 59
End: 2019-06-27
Payer: COMMERCIAL

## 2019-06-27 DIAGNOSIS — S46.011A TRAUMATIC INCOMPLETE TEAR OF RIGHT ROTATOR CUFF, INITIAL ENCOUNTER: ICD-10-CM

## 2019-06-27 DIAGNOSIS — M25.511 CHRONIC PAIN OF BOTH SHOULDERS: ICD-10-CM

## 2019-06-27 DIAGNOSIS — M75.81 ROTATOR CUFF TENDINITIS, RIGHT: ICD-10-CM

## 2019-06-27 DIAGNOSIS — M75.122 NONTRAUMATIC COMPLETE TEAR OF LEFT ROTATOR CUFF: ICD-10-CM

## 2019-06-27 DIAGNOSIS — M25.512 CHRONIC PAIN OF BOTH SHOULDERS: ICD-10-CM

## 2019-06-27 DIAGNOSIS — G89.29 CHRONIC PAIN OF BOTH SHOULDERS: ICD-10-CM

## 2019-06-27 DIAGNOSIS — S46.012A TRAUMATIC COMPLETE TEAR OF LEFT ROTATOR CUFF, INITIAL ENCOUNTER: ICD-10-CM

## 2019-06-27 PROCEDURE — 97110 THERAPEUTIC EXERCISES: CPT | Mod: GP | Performed by: PHYSICAL THERAPIST

## 2019-06-27 PROCEDURE — 97161 PT EVAL LOW COMPLEX 20 MIN: CPT | Mod: GP | Performed by: PHYSICAL THERAPIST

## 2019-06-27 PROCEDURE — 97112 NEUROMUSCULAR REEDUCATION: CPT | Mod: GP | Performed by: PHYSICAL THERAPIST

## 2019-06-27 NOTE — PROGRESS NOTES
[facility] Initial Evaluation  Subjective:    Type of problem:  Bilateral shoulders   Occurance: catching a falling object.    Problem details: MD order 6/21/19. Jonathan mentions bilateral shoulder pain, 5 years ago he was up in a ladder to remove the cabinet- it slipped and so her caught using his left shoulder. It hurt of couple of days and did not end up going too provider for 3 year. He has h/o ankylosing spondylitis, so the pain go worse while playing golf. [facility] was [facility] for [facility] shoulder- diagnosed with minor tear on right side and major tear left shoulder. He was not ready for surgery. Currently is having flare up while playing golf in his right shoulder. He got cortisone shot last Friday and feels better. He was advised to see PT for strengthening program. .   Patient reports pain:  Lateral.  Associated symptoms:  Loss of motion/stiffness, loss of strength and painful arc. Symptoms are exacerbated by lifting and using arm behind back (abduction, golfing) and relieved by NSAID's.    Jonathan Barillas being seen for shoulder pain.   Date of Onset: 5 years ago. Problem occurred: while catching cabinet that was falling  and reported as 4/10 on pain scale. General health as reported by patient is good.  Other medical history details: Ankylosing spondylitis.    Surgeries include:  Orthopedic surgery (right leg- ORIF).  Current medications:  Anti-inflammatory.   Primary job tasks include:  Computer work and prolonged sitting.  Pain is described as burning and is intermittent. Pain is the same all the time.  Special tests:  MRI.     Patient is Desk job. Restrictions include:  Working in normal job without restrictions.    Barriers include:  None as reported by patient.  Red flags:  None as reported by patient.                      Objective:  Standing Alignment:      Shoulder/UE:  Rounded shoulders                  Flexibility/Screens:     Upper Extremity:    Decreased left upper extremity  flexibility at:  Pectoralis Major and Pectoralis Minor    Decreased right upper extremity flexibility present at:  Pectoralis Major and Pectoralis Minor                           Shoulder Evaluation:  ROM:  AROM:            Internal Rotation:  Right:  3 cm difference anf increased pain mentioned  External Rotation:  Right:  7 degree lag with end ragne pain mentioned                  Pain: pain present with ROM past 90 degree shoulder flexion, abduction    Strength:  : poor scapular stabilization demonstrated.  Flexion: Left:5/5   Pain:    Right: 5-/5      Pain:  -/+  Extension:  Left: 5/5    Pain:    Right: 5/5    Pain:  Abduction:  Left: 5-/5   Pain:-/+    Right: 5-/5      Pain:+  Adduction:  Left: 5/5    Pain:    Right: 5/5     Pain:  Internal Rotation:  Left:5/5     Pain:    Right: 5/5     Pain:  External Rotation:   Left:5/5     Pain:   Right:5-/5      Pain:+      Horizontal Adduction:  Left:5-/5      Pain:-/+    Right:5-/5     Pain:  Elbow Flexion:  Left:5/5     Pain:    Right:5/5     Pain:  Elbow Extension:  Left:5/5     Pain:    Right:5/5     Pain:  Stability Testing:  normal      Special Tests:    Left shoulder positive for the following special tests:  Rotator cuff tear  Right shoulder positive for the following special tests:Rotator cuff tear  Palpation:      Right shoulder tenderness present at: Infraspinatus and Teres Minor                                     General     ROS    Assessment/Plan:    Patient is a 58 year old male with both sides shoulder complaints.    Patient has the following significant findings with corresponding treatment plan.                Diagnosis 1:  Bilateral shoulder pain- Left C complete tear, Right RC partial tear  Pain -  hot/cold therapy, US, electric stimulation, manual therapy, STS, splint/taping/bracing/orthotics, self management, education and home program  Decreased ROM/flexibility - manual therapy, therapeutic exercise, therapeutic activity and home program  Decreased  strength - therapeutic exercise, therapeutic activities and home program  Inflammation - cold therapy and self management/home program  Decreased function - therapeutic activities and home program  Impaired posture - neuro re-education, therapeutic activities and home program    Therapy Evaluation Codes:   1) History comprised of:   Personal factors that impact the plan of care:      Past/current experiences and Time since onset of symptoms.    Comorbidity factors that impact the plan of care are:      check HPI.     Medications impacting care: check HPI.  2) Examination of Body Systems comprised of:   Body structures and functions that impact the plan of care:      Shoulder.   Activity limitations that impact the plan of care are:      Lifting and Sports.  3) Clinical presentation characteristics are:   Stable/Uncomplicated.  4) Decision-Making    Low complexity using standardized patient assessment instrument and/or measureable assessment of functional outcome.  Cumulative Therapy Evaluation is: Low complexity.    Previous and current functional limitations:  (See Goal Flow Sheet for this information)    Short term and Long term goals: (See Goal Flow Sheet for this information)     Communication ability:  Patient appears to be able to clearly communicate and understand verbal and written communication and follow directions correctly.  Treatment Explanation - The following has been discussed with the patient:   RX ordered/plan of care  Anticipated outcomes  Possible risks and side effects  This patient would benefit from PT intervention to resume normal activities.   Rehab potential is good.    Frequency:  1 X week, once daily  Duration:  for 6 weeks  Discharge Plan:  Achieve all LTG.  Independent in home treatment program.  Reach maximal therapeutic benefit.    Please refer to the daily flowsheet for treatment today, total treatment time and time spent performing 1:1 timed codes.

## 2019-06-28 PROBLEM — M25.512 BILATERAL SHOULDER PAIN: Status: ACTIVE | Noted: 2019-06-28

## 2019-06-28 PROBLEM — M25.511 BILATERAL SHOULDER PAIN: Status: ACTIVE | Noted: 2019-06-28

## 2019-06-28 PROBLEM — M75.122 COMPLETE TEAR OF LEFT ROTATOR CUFF: Status: ACTIVE | Noted: 2019-06-28

## 2019-06-28 PROBLEM — S46.011A TRAUMATIC INCOMPLETE TEAR OF RIGHT ROTATOR CUFF: Status: ACTIVE | Noted: 2019-06-28

## 2019-07-11 ENCOUNTER — THERAPY VISIT (OUTPATIENT)
Dept: PHYSICAL THERAPY | Facility: CLINIC | Age: 59
End: 2019-07-11
Payer: COMMERCIAL

## 2019-07-11 DIAGNOSIS — G89.29 CHRONIC PAIN OF BOTH SHOULDERS: ICD-10-CM

## 2019-07-11 DIAGNOSIS — S46.012A TRAUMATIC COMPLETE TEAR OF LEFT ROTATOR CUFF, INITIAL ENCOUNTER: ICD-10-CM

## 2019-07-11 DIAGNOSIS — M25.511 CHRONIC PAIN OF BOTH SHOULDERS: ICD-10-CM

## 2019-07-11 DIAGNOSIS — M25.512 CHRONIC PAIN OF BOTH SHOULDERS: ICD-10-CM

## 2019-07-11 DIAGNOSIS — S46.011A TRAUMATIC INCOMPLETE TEAR OF RIGHT ROTATOR CUFF, INITIAL ENCOUNTER: ICD-10-CM

## 2019-07-11 PROCEDURE — 97140 MANUAL THERAPY 1/> REGIONS: CPT | Mod: GP | Performed by: PHYSICAL THERAPIST

## 2019-07-11 PROCEDURE — 97110 THERAPEUTIC EXERCISES: CPT | Mod: GP | Performed by: PHYSICAL THERAPIST

## 2019-07-11 PROCEDURE — 97112 NEUROMUSCULAR REEDUCATION: CPT | Mod: GP | Performed by: PHYSICAL THERAPIST

## 2019-08-28 PROBLEM — M25.511 BILATERAL SHOULDER PAIN: Status: RESOLVED | Noted: 2019-06-28 | Resolved: 2019-08-28

## 2019-08-28 PROBLEM — M25.512 BILATERAL SHOULDER PAIN: Status: RESOLVED | Noted: 2019-06-28 | Resolved: 2019-08-28

## 2019-08-28 PROBLEM — S46.011A TRAUMATIC INCOMPLETE TEAR OF RIGHT ROTATOR CUFF: Status: RESOLVED | Noted: 2019-06-28 | Resolved: 2019-08-28

## 2019-08-28 PROBLEM — M75.122 COMPLETE TEAR OF LEFT ROTATOR CUFF: Status: RESOLVED | Noted: 2019-06-28 | Resolved: 2019-08-28

## 2019-08-28 NOTE — PROGRESS NOTES
Discharge Note    Progress reporting period is from last progress note on   to Jul 11, 2019.    Jonathan failed to follow up and current status is unknown.  Please see information below for last relevant information on current status.  Patient seen for 2 visits.    SUBJECTIVE  Subjective changes noted by patient:  Jonathan mentions he is well, performing his exercises 2 x day, Lifting heavy objects can irrittae / flare up the pain.   .  Current pain level is 3/10.     Previous pain level was   .   Changes in function:  Yes (See Goal flowsheet attached for changes in current functional level)  Adverse reaction to treatment or activity: None    OBJECTIVE  Changes noted in objective findings: Right deltois tenderness present, improving scap stabilization demonstrated     ASSESSMENT/PLAN  Diagnosis: Bilateral shoulder pain- Left RC complete tear,  Right RC partial tear/ tendinitis.   Updated problem list and treatment plan:     STG/LTGs have been met or progress has been made towards goals:  Yes, please see goal flowsheet for most current information  Assessment of Progress: current status is unknown.    Last current status: Pt is progressing as expected   Self Management Plans:  HEP  I have re-evaluated this patient and find that the nature, scope, duration and intensity of the therapy is appropriate for the medical condition of the patient.  Jonathan continues to require the following intervention to meet STG and LTG's:  HEP.    Recommendations:  Discharge with current home program.  Patient to follow up with MD as needed.    Please refer to the daily flowsheet for treatment today, total treatment time and time spent performing 1:1 timed codes.

## 2019-09-30 ENCOUNTER — HEALTH MAINTENANCE LETTER (OUTPATIENT)
Age: 59
End: 2019-09-30

## 2020-01-10 ENCOUNTER — MYC REFILL (OUTPATIENT)
Dept: FAMILY MEDICINE | Facility: CLINIC | Age: 60
End: 2020-01-10

## 2020-01-10 DIAGNOSIS — M45.0 ANKYLOSING SPONDYLITIS OF MULTIPLE SITES IN SPINE (H): Primary | ICD-10-CM

## 2020-01-13 ENCOUNTER — MYC MEDICAL ADVICE (OUTPATIENT)
Dept: FAMILY MEDICINE | Facility: CLINIC | Age: 60
End: 2020-01-13

## 2020-01-13 RX ORDER — HYDROCODONE BITARTRATE AND ACETAMINOPHEN 5; 325 MG/1; MG/1
1 TABLET ORAL EVERY 6 HOURS PRN
Qty: 20 TABLET | Refills: 0 | Status: SHIPPED | OUTPATIENT
Start: 2020-01-13 | End: 2020-04-13

## 2020-01-13 NOTE — LETTER
[facility]          [address] [place] [address]                            [phone]  Fax: [phone]    Jonathan Barillas  [address] [address]    [phone]    January 14, 2020      To whom it may concern    Jonathan Barillas is a patient under my professional care. He has a chronic medical condition that causes constant pain, stiffness, inflammation and discomfort. Because of this condition, Mr. Barillas is unable to sit or stand comfortably for more than 1 hour at a time. He also experiences periodic flare ups which can make sitting or standing almost impossible.      If you have any other questions or concerns please feel free to contact me at anytime.        Sincerely,        Charlotte Bustos MD

## 2020-01-13 NOTE — TELEPHONE ENCOUNTER
Controlled Substance Refill Request for norco  Problem List Complete:  Yes  Noted:  9/20/2017   Priority:  Medium   Overview Addendum 9/20/2017  1:03 PM by Thuy Bustos MD   Patient is followed by Thuy Bustos MD for ongoing prescription of pain medication.  All refills should only be approved by this provider, or covering partner.     Medication(s): Norco 5-325 mg.   Maximum quantity per month: #40 every 3 months  Clinic visit frequency required: Q 6  months      Controlled substance agreement: Completed 9/19/2017      Encounter-Level CSA:      There are no encounter-level csa.          Pain Clinic evaluation in the past: No     DIRE Total Score(s):  No flowsheet data found.     Last [facility] website verification:  none   [email or web address]        checked in past 3 months?  Yes 01/13/2020

## 2020-01-16 NOTE — TELEPHONE ENCOUNTER
Letters picked up by patient on 01/15/2020.    .Silke BURNETTE    [facility] [place]

## 2020-04-13 ENCOUNTER — MYC REFILL (OUTPATIENT)
Dept: FAMILY MEDICINE | Facility: CLINIC | Age: 60
End: 2020-04-13

## 2020-04-13 DIAGNOSIS — M45.0 ANKYLOSING SPONDYLITIS OF MULTIPLE SITES IN SPINE (H): ICD-10-CM

## 2020-04-13 NOTE — TELEPHONE ENCOUNTER
Controlled Substance Refill Request for Norco  Problem List Complete:  No     PROVIDER TO CONSIDER COMPLETION OF PROBLEM LIST AND OVERVIEW/CONTROLLED SUBSTANCE AGREEMENT    Last Written Prescription Date:  1/13/2020  Last Fill Quantity: 20,   # refills: 0    THE MOST RECENT OFFICE VISIT MUST BE WITHIN THE PAST 3 MONTHS. AT LEAST ONE FACE TO FACE VISIT MUST OCCUR EVERY 6 MONTHS. ADDITIONAL VISITS CAN BE VIRTUAL.  (THIS STATEMENT SHOULD BE DELETED.)    Last Office Visit with [facility] primary care provider: 6/14/19 with CHLOE Hackett    Future Office visit:     Controlled substance agreement:   Encounter-Level CSA - 09/19/2017:    Controlled Substance Agreement - Scan on 9/21/2017  9:26 AM: CONTROLLED SUBSTANCE AGREEMENT     Patient-Level CSA:    There are no patient-level csa.         Last Urine Drug Screen: No results found for: CDAUT, No results found for: COMDAT, No results found for: THC13, PCP13, COC13, MAMP13, OPI13, AMP13, BZO13, TCA13, MTD13, BAR13, OXY13, PPX13, BUP13     RX [place] ([place]) reviewed:  reviewed- no concerns    [place] profile:  [email or web address]    Makayla Hernandez RN, BSN  [facility]

## 2020-04-14 RX ORDER — HYDROCODONE BITARTRATE AND ACETAMINOPHEN 5; 325 MG/1; MG/1
1 TABLET ORAL EVERY 6 HOURS PRN
Qty: 20 TABLET | Refills: 0 | Status: SHIPPED | OUTPATIENT
Start: 2020-04-14 | End: 2020-09-14

## 2020-09-14 ENCOUNTER — MYC REFILL (OUTPATIENT)
Dept: FAMILY MEDICINE | Facility: CLINIC | Age: 60
End: 2020-09-14

## 2020-09-14 DIAGNOSIS — M45.0 ANKYLOSING SPONDYLITIS OF MULTIPLE SITES IN SPINE (H): ICD-10-CM

## 2020-09-15 RX ORDER — HYDROCODONE BITARTRATE AND ACETAMINOPHEN 5; 325 MG/1; MG/1
1 TABLET ORAL EVERY 6 HOURS PRN
Qty: 20 TABLET | Refills: 0 | Status: SHIPPED | OUTPATIENT
Start: 2020-09-15 | End: 2020-10-29

## 2020-09-15 NOTE — TELEPHONE ENCOUNTER
Controlled Substance Refill Request for Norco  Problem List Complete:  No     PROVIDER TO CONSIDER COMPLETION OF PROBLEM LIST AND OVERVIEW/CONTROLLED SUBSTANCE AGREEMENT    Last Written Prescription Date:  4/14/2020  Last Fill Quantity: 20,   # refills: 0    THE MOST RECENT OFFICE VISIT MUST BE WITHIN THE PAST 3 MONTHS. AT LEAST ONE FACE TO FACE VISIT MUST OCCUR EVERY 6 MONTHS. ADDITIONAL VISITS CAN BE VIRTUAL.  (THIS STATEMENT SHOULD BE DELETED.)    Last Office Visit with [facility] primary care provider: 6/14/19 with Dr. Bustos.     Future Office visit:     Controlled substance agreement:   Encounter-Level CSA - 09/19/2017:    Controlled Substance Agreement - Scan on 9/21/2017  9:26 AM: CONTROLLED SUBSTANCE AGREEMENT     Patient-Level CSA:    There are no patient-level csa.         Last Urine Drug Screen: No results found for: CDAUT, No results found for: COMDAT, No results found for: THC13, PCP13, COC13, MAMP13, OPI13, AMP13, BZO13, TCA13, MTD13, BAR13, OXY13, PPX13, BUP13     RX monitoring program ([place]) reviewed:  reviewed- no concerns    [place] profile:  [email or web address]    Makayla Hernandez RN, BSN  [facility]

## 2020-10-28 ENCOUNTER — TELEPHONE (OUTPATIENT)
Dept: FAMILY MEDICINE | Facility: CLINIC | Age: 60
End: 2020-10-28

## 2020-10-28 NOTE — TELEPHONE ENCOUNTER
General Call:     Who is calling:  Pt    Reason for Call:  Pt has questions.  Thinks some food allergies    What are your questions or concerns:  na    Date of last appointment with provider: na    Okay to leave a detailed message:Yes at Home number on file [phone] (home)

## 2020-10-28 NOTE — TELEPHONE ENCOUNTER
Pt calling thinks he should schedule a physical since has not been seen for awhile.   Thinks he may have some food allergies or something else.  A couple of weeks ago he ate a BBQ brisket sandwich and a chocolate shake.  After eating developed cramping and diarrhea that lasted for about 1.5 hours.  Since then has been trying to experiment with eliminating certain foods like eggs, dairy, and gluten.  This morning ate a fruit drink with 5 different fruits in it and that did not agree with his system.    Advised an appt would be the best and discuss with provider about it.  Scheduled a physical with Gera Hackett for tomorrow 10/29 at 9 am.    Pt states understanding.    Sylvia TATE RN  EP Triage

## 2020-10-29 ENCOUNTER — OFFICE VISIT (OUTPATIENT)
Dept: FAMILY MEDICINE | Facility: CLINIC | Age: 60
End: 2020-10-29
Payer: COMMERCIAL

## 2020-10-29 VITALS
HEART RATE: 80 BPM | TEMPERATURE: 97.8 F | BODY MASS INDEX: 26.07 KG/M2 | WEIGHT: 176 LBS | HEIGHT: 69 IN | OXYGEN SATURATION: 95 % | SYSTOLIC BLOOD PRESSURE: 112 MMHG | DIASTOLIC BLOOD PRESSURE: 70 MMHG

## 2020-10-29 DIAGNOSIS — R10.84 ABDOMINAL PAIN, GENERALIZED: ICD-10-CM

## 2020-10-29 DIAGNOSIS — R19.7 DIARRHEA, UNSPECIFIED TYPE: Primary | ICD-10-CM

## 2020-10-29 LAB
ALBUMIN SERPL-MCNC: 3.7 G/DL (ref 3.4–5)
ALP SERPL-CCNC: 71 U/L (ref 40–150)
ALT SERPL W P-5'-P-CCNC: 64 U/L (ref 0–70)
ANION GAP SERPL CALCULATED.3IONS-SCNC: 8 MMOL/L (ref 3–14)
AST SERPL W P-5'-P-CCNC: 48 U/L (ref 0–45)
BASOPHILS # BLD AUTO: 0.1 10E9/L (ref 0–0.2)
BASOPHILS NFR BLD AUTO: 1.9 %
BILIRUB SERPL-MCNC: 0.8 MG/DL (ref 0.2–1.3)
BUN SERPL-MCNC: 11 MG/DL (ref 7–30)
CALCIUM SERPL-MCNC: 9.1 MG/DL (ref 8.5–10.1)
CHLORIDE SERPL-SCNC: 107 MMOL/L (ref 94–109)
CO2 SERPL-SCNC: 27 MMOL/L (ref 20–32)
CREAT SERPL-MCNC: 0.7 MG/DL (ref 0.66–1.25)
DIFFERENTIAL METHOD BLD: ABNORMAL
EOSINOPHIL # BLD AUTO: 0.1 10E9/L (ref 0–0.7)
EOSINOPHIL NFR BLD AUTO: 2.7 %
ERYTHROCYTE [DISTWIDTH] IN BLOOD BY AUTOMATED COUNT: 12.8 % (ref 10–15)
GFR SERPL CREATININE-BSD FRML MDRD: >90 ML/MIN/{1.73_M2}
GLUCOSE SERPL-MCNC: 90 MG/DL (ref 70–99)
HCT VFR BLD AUTO: 48.8 % (ref 40–53)
HGB BLD-MCNC: 17.4 G/DL (ref 13.3–17.7)
LIPASE SERPL-CCNC: 84 U/L (ref 73–393)
LYMPHOCYTES # BLD AUTO: 1.7 10E9/L (ref 0.8–5.3)
LYMPHOCYTES NFR BLD AUTO: 31.3 %
MCH RBC QN AUTO: 35.6 PG (ref 26.5–33)
MCHC RBC AUTO-ENTMCNC: 35.7 G/DL (ref 31.5–36.5)
MCV RBC AUTO: 100 FL (ref 78–100)
MONOCYTES # BLD AUTO: 0.5 10E9/L (ref 0–1.3)
MONOCYTES NFR BLD AUTO: 9.9 %
NEUTROPHILS # BLD AUTO: 2.9 10E9/L (ref 1.6–8.3)
NEUTROPHILS NFR BLD AUTO: 54.2 %
PLATELET # BLD AUTO: 257 10E9/L (ref 150–450)
POTASSIUM SERPL-SCNC: 3.5 MMOL/L (ref 3.4–5.3)
PROT SERPL-MCNC: 7.2 G/DL (ref 6.8–8.8)
RBC # BLD AUTO: 4.89 10E12/L (ref 4.4–5.9)
SODIUM SERPL-SCNC: 142 MMOL/L (ref 133–144)
WBC # BLD AUTO: 5.3 10E9/L (ref 4–11)

## 2020-10-29 PROCEDURE — 83690 ASSAY OF LIPASE: CPT | Performed by: PHYSICIAN ASSISTANT

## 2020-10-29 PROCEDURE — 99213 OFFICE O/P EST LOW 20 MIN: CPT | Performed by: PHYSICIAN ASSISTANT

## 2020-10-29 PROCEDURE — 80053 COMPREHEN METABOLIC PANEL: CPT | Performed by: PHYSICIAN ASSISTANT

## 2020-10-29 PROCEDURE — 85025 COMPLETE CBC W/AUTO DIFF WBC: CPT | Performed by: PHYSICIAN ASSISTANT

## 2020-10-29 PROCEDURE — 36415 COLL VENOUS BLD VENIPUNCTURE: CPT | Performed by: PHYSICIAN ASSISTANT

## 2020-10-29 ASSESSMENT — MIFFLIN-ST. JEOR: SCORE: 1598.71

## 2020-10-29 NOTE — PROGRESS NOTES
"Subjective     Jonathan Barillas is a 59 year old male who presents to clinic today for the following health issues:    HPI         Concern - Food allergies   Onset: 3 weeks   Description:  Intensity: moderate  Progression of Symptoms:  worsening  Accompanying Signs & Symptoms: diarrhea, nausea   Previous history of similar problem:   Precipitating factors:        Worsened by:   Alleviating factors:        Improved by:   Therapies tried and outcome:  none     Jonathan has been experiencing diarrhea over the past 3 weeks with some associated bloating, generalized abdominal discomfort.  The diarrhea began while he was traveling three weeks ago.  He reports eating a heavy meal at a restaurant and shortly after he had diarrhea and cramping.  The diarrhea has been occurring after eating.  It seems to occur shortly after eating any type of food, no specific trigger. He is having 1-2 episodes per day, still having normal stools between these episodes.      Review of Systems   Constitutional, HEENT, cardiovascular, pulmonary, gi and gu systems are negative, except as otherwise noted. He is s/p cholecystectomy.  He admits to drinking moderate amount of ETOH. No recent international travel or antibiotic use      Objective    Pulse 80   Temp 97.8  F (36.6  C) (Tympanic)   Ht 1.753 m (5' 9\")   Wt 79.8 kg (176 lb)   SpO2 95%   BMI 25.99 kg/m    Body mass index is 25.99 kg/m .  Physical Exam   GENERAL: healthy, alert and no distress  RESP: lungs clear to auscultation - no rales, rhonchi or wheezes  CV: regular rate and rhythm, normal S1 S2, no S3 or S4, no murmur, click or rub  ABDOMEN: soft, mild generalized TTP in all 4 quadrants, no hepatosplenomegaly, no masses and bowel sounds normal  PSYCH: mentation appears normal, affect normal/bright            Assessment & Plan     Unclear etiology of symptoms.  These could be related to food borne virus vs colitis.  Diarrhea seems intermittent and is not associated with vomiting. Will " "start with labs.  May consider stool cultures vs imaging if symptoms persist.  Advise that he keep a bland diet, avoid heavy/greasy foots and get plenty of fluids.         Diarrhea, unspecified type  - CBC with platelets and differential    Abdominal pain, generalized  - Comprehensive metabolic panel (BMP + Alb, Alk Phos, ALT, AST, Total. Bili, TP)  - Lipase     BMI:   Estimated body mass index is 25.99 kg/m  as calculated from the following:    Height as of this encounter: 1.753 m (5' 9\").    Weight as of this encounter: 79.8 kg (176 lb).     Return in about 1 day (around 10/30/2020) for with lab results and next step.    Gera Hackett PA-C  [facility]      "

## 2020-10-30 ENCOUNTER — TELEPHONE (OUTPATIENT)
Dept: FAMILY MEDICINE | Facility: CLINIC | Age: 60
End: 2020-10-30

## 2020-10-30 DIAGNOSIS — B35.1 ONYCHOMYCOSIS: Primary | ICD-10-CM

## 2020-10-30 NOTE — TELEPHONE ENCOUNTER
Please call Jonathan with the results of his labs.  Overall these are normal appearing with the exception of a minimally elevated AST  This is unlikely to be related to his symptoms.  Please ask how he is feeling and whether he is still having diarrhea.

## 2020-10-30 NOTE — TELEPHONE ENCOUNTER
Non detailed message left for pt to return call to clinic and ask to speak with a triage nurse.    Sylvia TATE RN  EP Triage

## 2020-11-02 RX ORDER — CICLOPIROX 80 MG/ML
SOLUTION TOPICAL
Qty: 6.6 ML | Refills: 3 | Status: SHIPPED | OUTPATIENT
Start: 2020-11-02

## 2020-11-02 NOTE — TELEPHONE ENCOUNTER
Ok.  If feeling better, he can continue to monitor.  If diarrhea persist, we should move forward with a colonoscopy.    Regarding his athletes foot, I will prescribe Penlac external solution.  He may apply to his nails everyday x 7 days, then remove and repeat this cycle until  New nail growth is seen

## 2020-11-02 NOTE — TELEPHONE ENCOUNTER
2nd attempt.  Non detailed message left for pt to return call to clinic and ask to speak with a triage nurse.    Sylvia TATE RN  EP Triage

## 2020-11-02 NOTE — TELEPHONE ENCOUNTER
Pt calling back and was given message from Gera below.  Pt states he is feeling a little better since seen in clinic.      Pt states he has had athletes foot that would not go away.  Has not been treated for athletes foot, but has done otc treatments.  Toenails are yellow in color and wondering if should be treated.  Denies odor.     Pharmacy pended.    Pt can be reached at [phone].    Sylvia TATE RN  EP Triage

## 2020-11-02 NOTE — TELEPHONE ENCOUNTER
S/w pt and gave Gera's reply below.  Pt will try the external solution and if not helping will call back.    Pt states understanding.    Sylvia TATE RN  EP Triage

## 2020-12-15 ENCOUNTER — MYC MEDICAL ADVICE (OUTPATIENT)
Dept: FAMILY MEDICINE | Facility: CLINIC | Age: 60
End: 2020-12-15

## 2020-12-15 DIAGNOSIS — M45.9 ANKYLOSING SPONDYLITIS, UNSPECIFIED SITE OF SPINE (H): ICD-10-CM

## 2020-12-16 RX ORDER — HYDROCODONE BITARTRATE AND ACETAMINOPHEN 5; 325 MG/1; MG/1
1 TABLET ORAL EVERY 6 HOURS PRN
Qty: 30 TABLET | Refills: 0 | Status: SHIPPED | OUTPATIENT
Start: 2020-12-16 | End: 2021-02-15

## 2021-01-15 ENCOUNTER — HEALTH MAINTENANCE LETTER (OUTPATIENT)
Age: 61
End: 2021-01-15

## 2021-02-13 ENCOUNTER — MYC MEDICAL ADVICE (OUTPATIENT)
Dept: FAMILY MEDICINE | Facility: CLINIC | Age: 61
End: 2021-02-13

## 2021-02-13 DIAGNOSIS — M45.9 ANKYLOSING SPONDYLITIS, UNSPECIFIED SITE OF SPINE (H): ICD-10-CM

## 2021-02-14 NOTE — TELEPHONE ENCOUNTER
Please see nCino message and advise.      Thank you,  Shirley ROBERTSONRN BSN  [facility]  [phone]

## 2021-02-15 RX ORDER — HYDROCODONE BITARTRATE AND ACETAMINOPHEN 5; 325 MG/1; MG/1
1 TABLET ORAL EVERY 6 HOURS PRN
Qty: 30 TABLET | Refills: 0 | Status: SHIPPED | OUTPATIENT
Start: 2021-02-15

## 2021-10-24 ENCOUNTER — HEALTH MAINTENANCE LETTER (OUTPATIENT)
Age: 61
End: 2021-10-24

## 2022-02-13 ENCOUNTER — HEALTH MAINTENANCE LETTER (OUTPATIENT)
Age: 62
End: 2022-02-13

## 2022-02-17 PROBLEM — G89.29 OTHER CHRONIC PAIN: Status: ACTIVE | Noted: 2017-09-20

## 2022-10-15 ENCOUNTER — HEALTH MAINTENANCE LETTER (OUTPATIENT)
Age: 62
End: 2022-10-15

## 2023-03-26 ENCOUNTER — HEALTH MAINTENANCE LETTER (OUTPATIENT)
Age: 63
End: 2023-03-26